# Patient Record
Sex: FEMALE | Race: OTHER | URBAN - METROPOLITAN AREA
[De-identification: names, ages, dates, MRNs, and addresses within clinical notes are randomized per-mention and may not be internally consistent; named-entity substitution may affect disease eponyms.]

---

## 2018-01-08 PROBLEM — E03.9 ACQUIRED HYPOTHYROIDISM: Status: RESOLVED | Noted: 2018-01-08 | Resolved: 2018-01-08

## 2018-01-08 PROBLEM — Z20.2 POSSIBLE EXPOSURE TO STD: Status: ACTIVE | Noted: 2018-01-08

## 2018-01-08 PROBLEM — E06.3 HASHIMOTO'S DISEASE: Status: ACTIVE | Noted: 2018-01-08

## 2018-01-08 PROBLEM — N76.6 GENITAL LABIAL ULCER: Status: ACTIVE | Noted: 2018-01-08

## 2018-01-08 PROBLEM — K62.5 RECTAL BLEEDING: Status: ACTIVE | Noted: 2018-01-08

## 2018-01-08 PROBLEM — R63.4 WEIGHT LOSS: Status: ACTIVE | Noted: 2018-01-08

## 2018-01-08 PROBLEM — E03.9 ACQUIRED HYPOTHYROIDISM: Status: ACTIVE | Noted: 2018-01-08

## 2018-01-08 PROCEDURE — 87591 N.GONORRHOEAE DNA AMP PROB: CPT | Performed by: INTERNAL MEDICINE

## 2018-01-08 PROCEDURE — 86803 HEPATITIS C AB TEST: CPT | Performed by: INTERNAL MEDICINE

## 2018-01-08 PROCEDURE — 86694 HERPES SIMPLEX NES ANTBDY: CPT | Performed by: INTERNAL MEDICINE

## 2018-01-08 PROCEDURE — 86695 HERPES SIMPLEX TYPE 1 TEST: CPT | Performed by: INTERNAL MEDICINE

## 2018-01-08 PROCEDURE — 82746 ASSAY OF FOLIC ACID SERUM: CPT | Performed by: INTERNAL MEDICINE

## 2018-01-08 PROCEDURE — 87340 HEPATITIS B SURFACE AG IA: CPT | Performed by: INTERNAL MEDICINE

## 2018-01-08 PROCEDURE — 82607 VITAMIN B-12: CPT | Performed by: INTERNAL MEDICINE

## 2018-01-08 PROCEDURE — 87491 CHLMYD TRACH DNA AMP PROBE: CPT | Performed by: INTERNAL MEDICINE

## 2018-01-08 PROCEDURE — 86696 HERPES SIMPLEX TYPE 2 TEST: CPT | Performed by: INTERNAL MEDICINE

## 2018-02-13 PROCEDURE — 88305 TISSUE EXAM BY PATHOLOGIST: CPT | Performed by: INTERNAL MEDICINE

## 2019-05-15 ENCOUNTER — APPOINTMENT (OUTPATIENT)
Dept: URBAN - METROPOLITAN AREA CLINIC 292 | Age: 29
Setting detail: DERMATOLOGY
End: 2019-05-15

## 2019-05-15 DIAGNOSIS — L81.0 POSTINFLAMMATORY HYPERPIGMENTATION: ICD-10-CM

## 2019-05-15 DIAGNOSIS — L70.0 ACNE VULGARIS: ICD-10-CM

## 2019-05-15 DIAGNOSIS — L90.5 SCAR CONDITIONS AND FIBROSIS OF SKIN: ICD-10-CM

## 2019-05-15 PROCEDURE — 10040 ACNE SURGERY: CPT

## 2019-05-15 PROCEDURE — OTHER PRESCRIPTION: OTHER

## 2019-05-15 PROCEDURE — OTHER COUNSELING: OTHER

## 2019-05-15 PROCEDURE — 99203 OFFICE O/P NEW LOW 30 MIN: CPT | Mod: 25

## 2019-05-15 PROCEDURE — OTHER CHEMICAL PEEL GLYCOLIC ACID: OTHER

## 2019-05-15 PROCEDURE — OTHER ACNE SURGERY: OTHER

## 2019-05-15 RX ORDER — ERYTHROMYCIN AND BENZOYL PEROXIDE 3 %-5 %
KIT TOPICAL
Qty: 1 | Refills: 6 | Status: ERX | COMMUNITY
Start: 2019-05-15

## 2019-05-15 RX ORDER — SPIRONOLACTONE 50 MG/1
TABLET, FILM COATED ORAL
Qty: 60 | Refills: 3 | Status: ERX | COMMUNITY
Start: 2019-05-15

## 2019-05-15 ASSESSMENT — LOCATION ZONE DERM: LOCATION ZONE: FACE

## 2019-05-15 ASSESSMENT — LOCATION DETAILED DESCRIPTION DERM
LOCATION DETAILED: LEFT SUPERIOR CENTRAL MALAR CHEEK
LOCATION DETAILED: LEFT INFERIOR CENTRAL MALAR CHEEK

## 2019-05-15 ASSESSMENT — LOCATION SIMPLE DESCRIPTION DERM: LOCATION SIMPLE: LEFT CHEEK

## 2019-05-15 NOTE — PROCEDURE: COUNSELING
Benzoyl Peroxide Pregnancy And Lactation Text: This medication is Pregnancy Category C. It is unknown if benzoyl peroxide is excreted in breast milk.
Tetracycline Pregnancy And Lactation Text: This medication is Pregnancy Category D and not consider safe during pregnancy. It is also excreted in breast milk.
Doxycycline Pregnancy And Lactation Text: This medication is Pregnancy Category D and not consider safe during pregnancy. It is also excreted in breast milk but is considered safe for shorter treatment courses.
Minocycline Counseling: Patient advised regarding possible photosensitivity and discoloration of the teeth, skin, lips, tongue and gums.  Patient instructed to avoid sunlight, if possible.  When exposed to sunlight, patients should wear protective clothing, sunglasses, and sunscreen.  The patient was instructed to call the office immediately if the following severe adverse effects occur:  hearing changes, easy bruising/bleeding, severe headache, or vision changes.  The patient verbalized understanding of the proper use and possible adverse effects of minocycline.  All of the patient's questions and concerns were addressed.
Erythromycin Counseling:  I discussed with the patient the risks of erythromycin including but not limited to GI upset, allergic reaction, drug rash, diarrhea, increase in liver enzymes, and yeast infections.
Topical Sulfur Applications Pregnancy And Lactation Text: This medication is Pregnancy Category C and has an unknown safety profile during pregnancy. It is unknown if this topical medication is excreted in breast milk.
Isotretinoin Counseling: Patient should get monthly blood tests, not donate blood, not drive at night if vision affected, not share medication, and not undergo elective surgery for 6 months after tx completed. Side effects reviewed, pt to contact office should one occur.
Azithromycin Pregnancy And Lactation Text: This medication is considered safe during pregnancy and is also secreted in breast milk.
Topical Clindamycin Pregnancy And Lactation Text: This medication is Pregnancy Category B and is considered safe during pregnancy. It is unknown if it is excreted in breast milk.
Tazorac Pregnancy And Lactation Text: This medication is not safe during pregnancy. It is unknown if this medication is excreted in breast milk.
Include Pregnancy/Lactation Warning?: No
Detail Level: Detailed
Doxycycline Counseling:  Patient counseled regarding possible photosensitivity and increased risk for sunburn.  Patient instructed to avoid sunlight, if possible.  When exposed to sunlight, patients should wear protective clothing, sunglasses, and sunscreen.  The patient was instructed to call the office immediately if the following severe adverse effects occur:  hearing changes, easy bruising/bleeding, severe headache, or vision changes.  The patient verbalized understanding of the proper use and possible adverse effects of doxycycline.  All of the patient's questions and concerns were addressed.
Bactrim Counseling:  I discussed with the patient the risks of sulfa antibiotics including but not limited to GI upset, allergic reaction, drug rash, diarrhea, dizziness, photosensitivity, and yeast infections.  Rarely, more serious reactions can occur including but not limited to aplastic anemia, agranulocytosis, methemoglobinemia, blood dyscrasias, liver or kidney failure, lung infiltrates or desquamative/blistering drug rashes.
Isotretinoin Pregnancy And Lactation Text: This medication is Pregnancy Category X and is considered extremely dangerous during pregnancy. It is unknown if it is excreted in breast milk.
High Dose Vitamin A Counseling: Side effects reviewed, pt to contact office should one occur.
Erythromycin Pregnancy And Lactation Text: This medication is Pregnancy Category B and is considered safe during pregnancy. It is also excreted in breast milk.
Spironolactone Counseling: Patient advised regarding risks of diarrhea, abdominal pain, hyperkalemia, birth defects (for female patients), liver toxicity and renal toxicity. The patient may need blood work to monitor liver and kidney function and potassium levels while on therapy. The patient verbalized understanding of the proper use and possible adverse effects of spironolactone.  All of the patient's questions and concerns were addressed.
Topical Clindamycin Counseling: Patient counseled that this medication may cause skin irritation or allergic reactions.  In the event of skin irritation, the patient was advised to reduce the amount of the drug applied or use it less frequently.   The patient verbalized understanding of the proper use and possible adverse effects of clindamycin.  All of the patient's questions and concerns were addressed.
Topical Retinoid Pregnancy And Lactation Text: This medication is Pregnancy Category C. It is unknown if this medication is excreted in breast milk.
Benzoyl Peroxide Counseling: Patient counseled that medicine may cause skin irritation and bleach clothing.  In the event of skin irritation, the patient was advised to reduce the amount of the drug applied or use it less frequently.   The patient verbalized understanding of the proper use and possible adverse effects of benzoyl peroxide.  All of the patient's questions and concerns were addressed.
Tetracycline Counseling: Patient counseled regarding possible photosensitivity and increased risk for sunburn.  Patient instructed to avoid sunlight, if possible.  When exposed to sunlight, patients should wear protective clothing, sunglasses, and sunscreen.  The patient was instructed to call the office immediately if the following severe adverse effects occur:  hearing changes, easy bruising/bleeding, severe headache, or vision changes.  The patient verbalized understanding of the proper use and possible adverse effects of tetracycline.  All of the patient's questions and concerns were addressed. Patient understands to avoid pregnancy while on therapy due to potential birth defects.
Birth Control Pills Counseling: Birth Control Pill Counseling: I discussed with the patient the potential side effects of OCPs including but not limited to increased risk of stroke, heart attack, thrombophlebitis, deep venous thrombosis, hepatic adenomas, breast changes, GI upset, headaches, and depression.  The patient verbalized understanding of the proper use and possible adverse effects of OCPs. All of the patient's questions and concerns were addressed.
Detail Level: Zone
High Dose Vitamin A Pregnancy And Lactation Text: High dose vitamin A therapy is contraindicated during pregnancy and breast feeding.
Bactrim Pregnancy And Lactation Text: This medication is Pregnancy Category D and is known to cause fetal risk.  It is also excreted in breast milk.
Tazorac Counseling:  Patient advised that medication is irritating and drying.  Patient may need to apply sparingly and wash off after an hour before eventually leaving it on overnight.  The patient verbalized understanding of the proper use and possible adverse effects of tazorac.  All of the patient's questions and concerns were addressed.
Topical Sulfur Applications Counseling: Topical Sulfur Counseling: Patient counseled that this medication may cause skin irritation or allergic reactions.  In the event of skin irritation, the patient was advised to reduce the amount of the drug applied or use it less frequently.   The patient verbalized understanding of the proper use and possible adverse effects of topical sulfur application.  All of the patient's questions and concerns were addressed.
Dapsone Pregnancy And Lactation Text: This medication is Pregnancy Category C and is not considered safe during pregnancy or breast feeding.
Azithromycin Counseling:  I discussed with the patient the risks of azithromycin including but not limited to GI upset, allergic reaction, drug rash, diarrhea, and yeast infections.
Birth Control Pills Pregnancy And Lactation Text: This medication should be avoided if pregnant and for the first 30 days post-partum.
Topical Retinoid counseling:  Patient advised to apply a pea-sized amount only at bedtime and wait 30 minutes after washing their face before applying.  If too drying, patient may add a non-comedogenic moisturizer. The patient verbalized understanding of the proper use and possible adverse effects of retinoids.  All of the patient's questions and concerns were addressed.
Dapsone Counseling: I discussed with the patient the risks of dapsone including but not limited to hemolytic anemia, agranulocytosis, rashes, methemoglobinemia, kidney failure, peripheral neuropathy, headaches, GI upset, and liver toxicity.  Patients who start dapsone require monitoring including baseline LFTs and weekly CBCs for the first month, then every month thereafter.  The patient verbalized understanding of the proper use and possible adverse effects of dapsone.  All of the patient's questions and concerns were addressed.
Spironolactone Pregnancy And Lactation Text: This medication can cause feminization of the male fetus and should be avoided during pregnancy. The active metabolite is also found in breast milk.

## 2019-05-15 NOTE — PROCEDURE: ACNE SURGERY
Prep Text (Optional): Prior to removal the treatment areas were prepped in the usual fashion.
Post-Care Instructions: I reviewed with the patient in detail post-care instructions. Patient is to keep the treatment areaas dry overnight, and then apply bacitracin twice daily until healed. Patient may apply hydrogen peroxide soaks to remove any crusting.
Detail Level: Detailed
Acne Type: Comedonal Lesions
Render Number Of Lesions Treated: no
Consent was obtained and risks were reviewed including but not limited to scarring, infection, bleeding, scabbing, incomplete removal, and allergy to anesthesia.

## 2019-05-15 NOTE — PROCEDURE: CHEMICAL PEEL GLYCOLIC ACID
Detail Level: Zone
Glycolic Acid %: 30%
Time (Mins): 3
Prep: The treated area was degreased with acetone and vaseline was applied for protection of mucous membranes.
Treatment Number: 0
Post Peel Care: The peel was neutralized with sodium bicarbonate.  After the procedure, the treatment area was washed with soap and water, and a post-peel cream was applied. Sun protection and post-care instructions were reviewed with the patient.
Price (Use Numbers Only, No Special Characters Or $): 00

## 2022-01-03 ENCOUNTER — OFFICE VISIT (OUTPATIENT)
Dept: DERMATOLOGY | Facility: CLINIC | Age: 32
End: 2022-01-03
Payer: COMMERCIAL

## 2022-01-03 VITALS — BODY MASS INDEX: 29.32 KG/M2 | WEIGHT: 176 LBS | TEMPERATURE: 97.2 F | HEIGHT: 65 IN

## 2022-01-03 DIAGNOSIS — L70.0 ACNE VULGARIS: Primary | ICD-10-CM

## 2022-01-03 PROCEDURE — 99204 OFFICE O/P NEW MOD 45 MIN: CPT | Performed by: DERMATOLOGY

## 2022-01-03 RX ORDER — DOXYCYCLINE HYCLATE 100 MG/1
100 CAPSULE ORAL 2 TIMES DAILY
Qty: 60 CAPSULE | Refills: 3 | Status: SHIPPED | OUTPATIENT
Start: 2022-01-03 | End: 2022-04-03

## 2022-01-03 RX ORDER — LEVOTHYROXINE SODIUM 0.07 MG/1
75 TABLET ORAL DAILY
COMMUNITY

## 2022-01-03 RX ORDER — DEXTROAMPHETAMINE SACCHARATE, AMPHETAMINE ASPARTATE MONOHYDRATE, DEXTROAMPHETAMINE SULFATE AND AMPHETAMINE SULFATE 5; 5; 5; 5 MG/1; MG/1; MG/1; MG/1
20 CAPSULE, EXTENDED RELEASE ORAL EVERY MORNING
COMMUNITY

## 2022-01-03 RX ORDER — BUPROPION HYDROCHLORIDE 100 MG/1
100 TABLET ORAL 2 TIMES DAILY
COMMUNITY

## 2022-01-03 RX ORDER — SPIRONOLACTONE 25 MG/1
25 TABLET ORAL DAILY
Qty: 30 TABLET | Refills: 3 | Status: SHIPPED | OUTPATIENT
Start: 2022-01-03

## 2022-01-03 NOTE — PROGRESS NOTES
[facility] Note     Patient Name: Tushar Van  Encounter Date: 01/03/2022     Have you been cared for by a [facility] in the last 3 years and, if so, which one? No    · Have you traveled outside of the [address] [place] [address] in the past 3 months or outside of the [facility] area in the last 2 weeks? No     May we call your Preferred Phone number to discuss your specific medical information? Yes     May we leave a detailed message that includes your specific medical information? Yes      Today's Chief Concerns:   Concern #1: Acne    Past Medical History:  Have you personally ever had or currently have any of the following? · Skin cancer (such as Melanoma, Basal Cell Carcinoma, Squamous Cell Carcinoma? (If Yes, please provide more detail)- No  · Eczema: No  · Psoriasis: No  · HIV/AIDS: No  · Hepatitis B or C: No  · Tuberculosis: No  · Systemic Immunosuppression such as Diabetes, Biologic or Immunotherapy, Chemotherapy, Organ Transplantation, Bone Marrow Transplantation (If YES, please provide more detail): No  · Radiation Treatment (If YES, please provide more detail): No  · Any other major medical conditions/concerns? (If Yes, which types)- No    Social History:     What is/was your primary occupation? Student      What are your hobbies/past-times? Basketball     Family History:  Have any of your "first degree relatives" (parent, brother, sister, or child) had any of the following       · Skin cancer such as Melanoma or Merkel Cell Carcinoma or Pancreatic Cancer? No  · Eczema, Asthma, Hay Fever or Seasonal Allergies: YES,   · Psoriasis or Psoriatic Arthritis: YES,   · Do any other medical conditions seem to run in your family? If Yes, what condition and which relatives?   No    Current Medications:     Current Outpatient Medications:     amphetamine-dextroamphetamine (ADDERALL XR) 20 MG 24 hr capsule, Take 20 mg by mouth every morning, Disp: , Rfl:   buPROPion (WELLBUTRIN) 100 mg tablet, Take 100 mg by mouth 2 (two) times a day, Disp: , Rfl:     levothyroxine 75 mcg tablet, Take 75 mcg by mouth daily, Disp: , Rfl:       Review of Systems:  Have you recently had or currently have any of the following? If YES, what are you doing for the problem? · Fever, chills or unintended weight loss: No  · Sudden loss or change in your vision: No  · Nausea, vomiting or blood in your stool: No  · Painful or swollen joints: No  · Wheezing or cough: No  · Changing mole or non-healing wound: No  · Nosebleeds: No  · Excessive sweating: No  · Easy or prolonged bleeding? No  · Over the last 2 weeks, how often have you been bothered by the following problems? · Taking little interest or pleasure in doing things: 1 - Not at All  · Feeling down, depressed, or hopeless: 1 - Not at All  · Rapid heartbeat with epinephrine:  No    · FEMALES ONLY:    · Are you pregnant or planning to become pregnant? No  · Are you currently or planning to be nursing or breast feeding? No    · Any known allergies? · No Known Allergies      Physical Exam:     Was a chaperone (Derm Clinical Assistant) present throughout the entire Physical Exam? Yes     Did the Dermatology Team specifically  the patient on the importance of a Full Skin Exam to be sure that nothing is missed clinically?  Yes}  o Did the patient ultimately request or accept a Full Skin Exam?  NO  o Did the patient specifically refuse to have the areas "under-the-bra" examined by the Dermatologist? No  o Did the patient specifically refuse to have the areas "under-the-underwear" examined by the Dermatologist? No    CONSTITUTIONAL:   Vitals:    01/03/22 1446   Temp: (!) 97 2 °F (36 2 °C)   TempSrc: Tympanic   Weight: 79 8 kg (176 lb)   Height: 5' 5" (1 651 m)       PSYCH: Normal mood and affect  EYES: Normal conjunctiva  ENT: Normal lips and oral mucosa  CARDIOVASCULAR: No edema  RESPIRATORY: Normal respirations  HEME/LYMPH/IMMUNO:  No regional lymphadenopathy except as noted below in "ASSESSMENT AND PLAN BY DIAGNOSIS"    SKIN:  FULL ORGAN SYSTEM EXAM   Hair, Scalp, Ears, Face Normal except as noted below in Assessment   Neck, Cervical Chain Nodes Normal except as noted below in Assessment   Right Arm/Hand/Fingers    Left Arm/Hand/Fingers    Chest/Breasts/Axillae    Abdomen, Umbilicus    Back/Spine    Groin/Genitalia/Buttocks    Right Leg, Foot, Toes    Left Leg, Foot, Toes         Assessment and Plan by Diagnosis:    History of Present Condition:     Duration:  How long has this been an issue for you?    o  Years    Location Affected:  Where on the body is this affecting you?    o  Face    Quality:  Is there any bleeding, pain, itch, burning/irritation, or redness associated with the skin lesion?    o  Denies    Severity:  Describe any bleeding, pain, itch, burning/irritation, or redness on a scale of 1 to 10 (with 10 being the worst)  o  10 mentally    Timing:  Does this condition seem to be there pretty constantly or do you notice it more at specific times throughout the day? o  constantly    Context:  Have you ever noticed that this condition seems to be associated with specific activities you do?    o  Denies    Modifying Factors:    o Anything that seems to make the condition worse?    -  Denies   o What have you tried to do to make the condition better? -  Differin     Associated Signs and Symptoms:  Does this skin lesion seem to be associated with any of the following:  o  SL AMB DERM SIGNS AND SYMPTOMS: Redness     ACNE VULGARIS ("COMMON ACNE")    Physical Exam:   Psychiatric/Mood:   Anatomic Location Affected:   Face    Morphological Description: Moderate to severe open and close inflammatory papules   o Open/Closed Comedones:  - Several ("Moderate")  o Inflammatory Papules/Pustules:  - Many ("Severe")  o Nodules:  - Several ("Moderate")  o Scarring:  - Several ("Moderate")  o Excoriations:  - Few ("Mild")  o Local Skin Redness/Erythema:  - Several ("Moderate")  o Local Skin Dryness/Scaling:  - Few ("Mild")  o Local Skin Dyspigmentation:  - Several ("Moderate")   Pertinent Positives:   Pertinent Negatives: n/a     Additional History of Present Condition:  Located on the face  Present for years  Reports menses as a aggravating factor, picking her face  Was treated with Differin cream once daily  Patient reports drying to much, not improved     Assessment and Plan:   We reviewed the causes of acne, the kinds of acne, and the expected clinical course   We discussed treatment options ranging from over-the-counter products, topical retinoids, antibiotics, BP, hormonal therapies (OCPs/spironolactone), and isotretinoin (Accutane)   We reviewed specific over-the-counter interventions and medications  Recommended typical hygiene measures including water-based facial products, washing regularly with mild cleanser, and refraining from picking and popping any pimples   Recommended non-comedogenic sunscreen use daily   Expectations of therapy discussed  Side effects, risks and benefits of medications discussed   A comprehensive handout on Acne was provided   The phone number to call in case of questions or concerns (and instructions to stop medications in such a scenario) was provided     After lengthy discussion of etiology and treatment options, we decided to implement the following personalized treatment plan:    Based on a thorough discussion of this condition and the management approach to it (including a comprehensive discussion of the known risks, side effects and potential benefits of treatment), the patient (family) agrees to implement the following specific plan:    --------------------------------------------------------------------------------------  YOUR PERSONALIZED ACNE ACTION PLAN    [address]    1) SKIN HYGIENE:  In the shower, wash your face, chest and back gently with Cetaphil moisturizing cleanser or Dove Fragrance-free bar  Do not use a luffa or washcloth as these tend to be too irritating to acne-prone skin  2) ANTIBIOTICS:     Doxycycline Take 1 tablet with a full glass of water and food     Start Azelaic Acid 20% cream     EVENING ROUTINE    1) SKIN HYGIENE:  In the shower, wash your face, chest and back gently with Cetaphil moisturizing cleanser or Dove Fragrance-free bar  Do not use a lufa or washcloth as these tend to be too irritating to acne-prone skin  2) ANTIBIOTICS:     Doxycycline Take 1 tablet with a full glass of water and food     Start Azelaic Acid 20% cream     REMEMBER:  Always take your acne pills with lots of water! A pill stuck in your throat can cause significant burning and irritation  Drink a full glass of water to ensure the pill gets into your stomach  Avoid popping a pill right before bed, and stay upright for at least 1 hour after taking a pill  ACNE:  WHAT ZIT ALL ABOUT? WHY DO I HAVE ACNE/PIMPLES? Your skin is made of layers  To keep the skin from becoming dry and cracked, the skin needs oil  The oil is made in little wells in the deeper layers in the skin  People with acne have glands that make more oil and are more easily plugged, causing the glands to swell  Hormones, bacteria and your inherited tendency to have acne all play a role  The medical term for pimples is acne or acne vulgaris (vulgaris means common)  Most people get some acne  Acne does not come from being dirty  Instead, it is an expected consequence of changes that occur during normal growth and development  Hormones, bacteria, and your family's tendency to have acne may all play a role  Whiteheads or blackheads are openings of the glands (glands are the oil factories) onto the surface of the skin   Blackheads are not caused by dirt blocking the pores; instead, they result from the oxidation reaction of oil and skin in the pores with the air (like a rust reaction)  WHAT ABOUT STRESS? Stress does not cause acne but it can make it worse  Make sure you get enough sleep and daily exercise! WHAT ABOUT FOODS/DIET? Try to eat a balanced, healthy diet  Some people feel that certain foods worsen their acne  While there aren't many studies available on this question, severe dietary changes are unlikely to help your acne and may be harmful to the health of your skin  If you find that a certain food seems to aggravate your acne, you may consider avoiding that food  Discuss this with your physician! WHAT CAUSES MY ACNE? There are four contributors to acne--the body's natural oil (sebum), clogged pores, bacteria (with the scientific name Propionibacterium acnes, or P  acnes, for short), and the body's reaction to the bacteria living in the clogged pores (which causes inflammation)  Here's what happens:     Sebum is produced in the normal oil-making glands in the deeper layers of the skin and reaches the surface through the skin's pores  An increase in certain hormones occurs around the time of puberty, and these hormones trigger the oil glands to produce increased amounts of sebum   Pores with excess oil tend to become clogged more easily   At the same time, P  acnes--one of the many types of bacteria that normally live on everyone's skin--thrives in the excess oil and causes a skin reaction (inflammation)   If a pore is clogged close to the surface, there is little inflammation  However, this results in the formation of whiteheads (closed comedones) or blackheads (open comedones) at the surface of the skin   A plug that extends to, or forms a little deeper in the pore, or one that enlarges or ruptures may cause more inflammation  The result is red bumps (papules) and pus-filled pimples (pustules)     If plugging happens in the deepest skin layer, the inflammation may be even more severe, resulting in the formation of nodules or cysts  When these types of acne heal, they may leave behind discolored areas or true scars  SKIN HYGIENE:  HOW SHOULD I [address] MY SKIN? Acne does not come from being dirty, however, washing your face is part of taking good care of your skin and will help keep your face clear  Good skin hygiene is, therefore, critical to support any acne treatment plan  Here are several specific suggestions for practicing good skin hygiene and keeping your skin looking its best:     You should wash acne-prone skin TWICE A DAY: Once in the morning and once in the evening  This does include any showers you take that day, so do not overdo it!  Do not scrub the skin with a washcloth or loofah as these can irritate and inflame your acne  Acne does not come from dirt, so it is not necessary to scrub the skin clean  In fact, scrubbing may lead to dryness and irritation that makes the acne even worse and harder for patients to tolerate acne medications   Use a gentle facial moisturizing cleanser (Cetaphil Moisturizing Cleanser or Dove Fragrance-Free bar)  Avoid using soaps like Hadley Hodgson, Sara [address], [address], or soft/liquid soaps as these products will dry your skin   Do not use any over-the-counter acne washes without your doctor's specific instruction to do so  These products often contain salicylic acid or benzoyl peroxide  These ingredients can be helpful in clearing oil from the skin and reducing bacteria, but they may also be drying and can add to irritation   Do not use exfoliating products with microbeads or brushes as these can cause irritation to the skin   Facials and other treatments to remove, squeeze, or clean out pores are not recommended  Manipulating the skin in this way can make acne worse and can lead to severe infections and/or scarring  It also increases the likelihood that the skin will not be able to tolerate acne medications      Try not to pop pimples or pick at your acne as this can delay healing and may result in scarring or skin color changes (dark spots) that are often more noticeable than the acne itself  Picking/popping acne can also cause a serious skin infection   Wash or change your pillow case once to twice a week, especially if you use products in your hair   Wash the skin as soon as possible after playing sports or other activities that cause a lot of sweating  Also, pay attention to how your sports equipment (shoulder pads, helmet strap, etc ) might be making your acne worse   When you use makeup, moisturizer, or sunscreen make sure that these products are labeled non-comedogenic, or won't clog pores, or won't cause acne         SHOULD I TREAT MY ACNE? There are a number of other skin conditions that can look like acne  If there is any question about the diagnosis, then the person should be evaluated by a board certified pediatric and adolescent dermatologist   A physician should examine any child with acne who is between the ages of 3and 9years of age, as acne in this mid-childhood age group is not normal and may signal an underlying problem  If a preadolescent (9to 6years of age) or adolescent (15to 25years of age) has mild acne and the condition is not bothersome to the individual, proper and regular skin care (what your doctor may call skin hygiene) may be all that is needed at this point  Many people do, however, need specific acne medications to help their skin look and feel its best  Your doctor will tell you if you are one of these people  If so, you may be advised to use an over-the-counter or prescription medication that is applied to the skin (a topical medication) or if the addition of an oral medication (a medication taken by [employer]) is needed  The good news is that the medications work well when used properly!   Some specific factors that may influence the choice of acne therapy include:     Severity  The number and type of skin lesions (papules or comedones) and the degree of inflammation (mild, moderate or severe)   Scarring  Scarring is most common when acne is severe, but it can happen even in children with mild acne   Impact  If a child is experiencing emotional complications because of the acne or is experiencing negative comments from other children   Cost of the acne medications  An acne expert can help to keep out of pocket costs to a minimum by utilizing the correct medications and the least expensive options   The patient's skin type (oily versus dry or combination skin, for example)   Potential side effects of the medication   The ease or overall complexity of the treatment plan or medication  WHAT ACNE TREATMENTS ARE AVAILABLE? Medications for acne try to stop the formation of new pimples by reducing or removing the oil, bacteria, and other things (like dead skin cells) that clog the pores  They can also decrease the inflammation or irritation response of the skin to bacteria  It may take from 6 to 8 weeks (about 2 months!) before you see any improvement and know if the medication is effective  It takes the layers of skin this long to regenerate  Remember, these medications do not cure the condition--the acne improves because of the medication  Therefore, treatment must be continued in order to prevent the return of acne lesions  There are many types of acne treatments  Some are applied to the skin (topical medications) and some are taken by mouth (oral medications)  In most cases of mild acne, the doctor will start with a topical medication  There are many different topical medications that are helpful for acne    If acne is more severe and it does not respond adequately to a topical medication, or if it covers large body surface areas such as the back and/or chest, oral antibiotics such as Doxycycline or Minocycline and/or oral hormone therapy such as Oral Contraceptive Pills or Spironolactone may be prescribed  In the most severe cases, isotretinoin (Accutane) may be used  In general, it is usually best to start with acne medications that are least likely to cause side effects but are at the same time capable of addressing the specific causes for the acne  Some patients have a good result with just one medication, but many will need to use a combination of treatments: two or more different topical agents or an oral medication plus a topical medication  Another treatment used for acne may include corticosteroid injections, which are used to help relieve pain, decrease the size, and encourage the healing of large, inflamed acne nodules  Also, dermatologists sometimes perform acne surgery, using a fine needle, a pointed blade, or an instrument known as a comedone extractor to mechanically clean out clogged pores  One must always weigh the risk for inducing a scar with the potential benefits of any procedure  Prior treatment with topical retinoids can loosen whiteheads and blackheads and make it easier to physically remove such lesions  Heat-based devices, and light and laser therapy are being studied to see whether there is any role for such treatments in mild to moderate acne  At this time, there is not enough evidence to make general recommendations about their use  TOPICAL ACNE MEDICATIONS    WHAT KIND OF TOPICALS ARE THERE?  Benzoyl peroxide (BP) helps to fight inflammation and is anti-microbial (kills bacteria, viruses, and other microorganisms) and is believed to help prevent resistance of bacteria to topical antibiotics  A benzoyl peroxide wash may be recommended for use on large areas such as the chest and/or back  Mild irritation and dryness are common when first using benzoyl peroxide-containing products  Be careful because benzoyl peroxide can bleach towels and clothing!    Retinoids (such as adapalene, tretinoin, or tazarotene) unplug the oil glands by helping peel away the layers of skin and other things plugging the opening of the glands  Mild irritation and dryness are common when first using these products  Facial waxing and other skin procedures can lead to excessive irritation and should be avoided during retinoid therapy   Antibiotics fight bacteria and help decrease inflammation  Topical antibiotics commonly used in acne include clindamycin, erythromycin, and combination agents (such as clindamycin/benzoyl peroxide or erythromycin/benzoyl peroxide)  Mild irritation and dryness are common when first using these products  Typically, topical antibiotics should not be used alone as treatment for acne   Other topical agents include salicylic acid, azelaic acid, dapsone, and sulfacetamide  Mild irritation and dryness can also occur when first using these products  USING YOUR TOPICAL TREATMENTS LIKE A PRO   Apply topical medications only to clean, dry skin  Topical medications may lead to significant dryness of the affected areas  To minimize this, wait 15-20 minutes after washing before applying your topical medication   These medications work deep in the skin to prevent new breakouts  Spot treatment of individual pimples does not do much  When applying topical medications to the face, use the 5-dot method  Start by placing a small pea-sized amount of the medication on your finger  Then, place dots in each of five locations of your face: Mid-forehead, each cheek, nose, and chin  Next, rub the medication into the entire area of skin - not just on individual pimples! Try to avoid the delicate skin around your eyes and corners of your mouth   The medications are not magic! They take weeks if not months to work  Be patient and use your medicine on a daily basis or as directed for six weeks before asking if your skin looks better   Try not to miss more than one or two days each week when using your medications   If you are starting a new medication, then try using it every other night or even every third night   Gradually work up to [place] [employer] [place] a day    This will give your skin time to adjust    The same medications often come in various forms or formulations: Creams, ointments, lotions, gels, microspheres, or foams  Use the formulation that has been recommended and don't switch to other forms unless instructed  Some forms (such as alcohol based gels) may be more drying and less tolerable for certain skin types   Sometimes individual medications are not as effective as a combination of two or more agents  The doctor may need to try several medications or combinations before finding the one that is best for that patient   Moisturizer, sunscreen, and make-up may be used in conjunction with topical acne medications  In general, acne medications are applied first so they may directly contact the skin  Ask your physician to review specific application instructions!  It is especially important to always use sunscreen when using a topical retinoid or oral antibiotic  These drugs can make your skin more sensitive to the sun  In general, sunscreen gets applied AFTER any acne medications   Don't stop using your acne medications just because your acne got better  Remember, the acne is better because of the medication, and prevention is the geller to treatment  ORAL ACNE MEDICATIONS    ORAL ANTIBIOTICS  Antibiotics include tetracycline-class medicines (which include the most commonly used oral antibiotics for acne, minocycline, and doxycycline), erythromycin, trimethoprim-sulfamethoxazole, and occasionally cephalexin or azithromycin  These drugs may decrease bacteria and inflammation, and they are most effective for moderate-to-severe inflammatory acne  A product containing benzoyl peroxide should be used along with these antibiotics to help decrease the possibility of microbial resistance      Always take your acne pills with lots of water! A pill stuck in your throat can cause significant burning and irritation  Drink a full glass of water to ensure the pill gets into your stomach  Avoid popping a pill right before bed, and stay upright for at least 1 hour after taking a pill  DOXYCYCLINE   This medication is usually taken ONCE or TWICE per day, as instructed by your physician  NOTE: Always take this medication with lots of water! A pill stuck in the throat can cause significant burning and irritation  Avoid popping a pill right before bed & stay upright for at least one hour after taking a pill  WARNING: Doxycycline increases your sensitivity to the sun, so practice excellent sun protection! If you notice any of the following, stop using the medication and notify your health care provider: headaches; blurred vision; dizziness; sun sensitivity; heartburn-stomach pain; irritation of the esophagus; darkening of scars, gums, or teeth (more often with minocycline); nail changes; yellowing of the eyes or skin (indicating possible liver disease); joint pains-and flu-like symptoms  Taking oral antibiotics with food may help with symptoms of upset stomach  COMMON SIDE EFFECTS: Headaches; dizziness; sun sensitivity; irritation of the throat; discoloration of scars, gums, or teeth; nail changes  MINOCYCLINE   This medication is usually taken ONCE or TWICE per day, as instructed by your physician  NOTE: Always take this medication with lots of water! A pill stuck in the throat can cause significant burning and irritation  Avoid popping a pill right before bed & stay upright for at least one hour after taking a pill  WARNING: Though less likely than doxycycline, minocycline may increase your sensitivity to the sun, so practice excellent sun protection!  If you notice any of the following, stop using the medication and notify your health care provider: headaches; blurred vision; dizziness; sun sensitivity; heartburn-stomach pain; irritation of the throat; darkening of scars, gums, or teeth; nail changes; yellowing of the eyes or skin (indicating possible liver disease); joint pains-and flu-like symptoms  Taking oral antibiotics with food may help with symptoms of upset stomach  COMMON SIDE EFFECTS: Headaches; dizziness; sun sensitivity; irritation of the throat; discoloration of scars, gums, or teeth (often with minocycline); nail changes  Minocycline can rarely cause liver disease, joint pains, severe skin rashes, and flu-like symptoms  If you should notice yellowing of the eyes or skin, or any of the above, notify your doctor and stop using the medication immediately  HORMONAL THERAPY  Hormonal treatment is used only in females and usually consists of oral contraceptives (birth control pills)  Spironolactone is also sometimes used  ORAL CONTRACEPTIVE PILLS   This medication is also known as the Birth Control Pill    We use it for hormonal regulation of acne  Take this medication as directed on the medication packet  NOTE: Try to find a regular time in your day to take the pill so that you don't forget  The best time is about half an hour after a meal or snack, or at bedtime  If you do forget to take your daily pill at the regular time, take one as soon as you remember and take the next at your regular scheduled time  WARNING: Do not take this medication until discussing it with your physician if you smoke, are pregnant (or trying to become pregnant or could be pregnant), have a personal history of breast cancer, have any artificial hardware or implants, have a condition called Factor 5 Leiden deficiency, have a family history of clotting problems, regularly have migraine headaches (especially with aura or due to flashing lights), or have any vaginal bleeding other than that associated with your menstrual cycle       ORAL ISOTRETINOIN (used to be called the brand name ACCUTANE)  Isotretinoin, a derivative of vitamin A, is a powerful drug with several significant potential side effects  It is reserved for acne which is severe or when other medications have not worked well enough  It used to be sold under the brand name Accutane but now several versions exist       HAVING PROBLEMS WITH ANY OF YOUR TREATMENTS? You should not be able to see any of the medicines on your face  If you can see a white film on your skin after you apply the medication, there is too much medicine in that area and you need to apply a thinner coat and make sure it is spread evenly on your face  If your skin gets too dry, you can apply a light (non-comedogenic) moisturizer on top of your medicine or you may switch to using the medicine every other day instead of every day  If your skin is still too irritated, you may need to switch to a milder medication  If your skin is red and very itchy, you may be allergic to the medication and you should stop using it  COMMON POSSIBLE SIDE EFFECTS OF MEDICATIONS     Retinoids - dryness, redness, increased sun sensitivity   Benzoyl peroxide - drying, redness, bleaching of clothes, towels and sheets, allergy   Doxycycline - headaches; dizziness; irritation of the throat; nail changes; discoloration of teeth   Sun sensitivity - even if you have dark skin, this medicine can make you burn more easily  Make sure you protect yourself from the sun, either by avoiding being outside between 11 AM and 3 PM, wearing and reapplying sunscreen/sunblock, or wearing sun protective clothing   Nausea/vomiting - if you experience nausea with this medication, take it with food   Minocycline - headaches; dizziness; vision problems,  irritation of the throat; discoloration of scars, gums, or teeth  Can rarely cause liver disease, joint pains, and flu-like symptoms       If you should notice yellowing of the skin or any of the above, notify your doctor and stop using the medication   Birth Control Pills - nausea; headaches; breast tenderness; feeling bloated; mood changes   Spotting between periods may occur for the first three weeks of the medication, but this is not serious  It may last for two or three cycles  Please call us if the bleeding is heavier than a light flow or lasts for more than a few days  WHEN AND WHERE TO CALL WITH CONCERNS  We are here to help! If you experience any unusual symptoms, then stop taking or using the medication and call our office at [phone])  It is better to be safe than to be sorry!       Scribe Attestation    I,:  Gilbert Busch MA am acting as a scribe while in the presence of the attending physician :       I,:  Abdi Su MD personally performed the services described in this documentation    as scribed in my presence :

## 2022-01-03 NOTE — PATIENT INSTRUCTIONS
YOUR PERSONALIZED ACNE ACTION PLAN    [address] [facility]    1) SKIN HYGIENE:  In the shower, wash your face, chest and back gently with Cetaphil moisturizing cleanser or Dove Fragrance-free bar  Do not use a luffa or washcloth as these tend to be too irritating to acne-prone skin  2) ANTIBIOTICS:     Doxycycline Take 1 tablet with a full glass of water and food     Start Azelaic Acid 20% cream     EVENING ROUTINE    1) SKIN HYGIENE:  In the shower, wash your face, chest and back gently with Cetaphil moisturizing cleanser or Dove Fragrance-free bar  Do not use a lufa or washcloth as these tend to be too irritating to acne-prone skin  2) ANTIBIOTICS:     Doxycycline Take 1 tablet with a full glass of water and food     Start Azelaic Acid 20% cream     REMEMBER:  Always take your acne pills with lots of water! A pill stuck in your throat can cause significant burning and irritation  Drink a full glass of water to ensure the pill gets into your stomach  Avoid popping a pill right before bed, and stay upright for at least 1 hour after taking a pill  ACNE:  WHAT ZIT ALL ABOUT? WHY DO I HAVE ACNE/PIMPLES? Your skin is made of layers  To keep the skin from becoming dry and cracked, the skin needs oil  The oil is made in little wells in the deeper layers in the skin  People with acne have glands that make more oil and are more easily plugged, causing the glands to swell  Hormones, bacteria and your inherited tendency to have acne all play a role  The medical term for pimples is acne or acne vulgaris (vulgaris means common)  Most people get some acne  Acne does not come from being dirty  Instead, it is an expected consequence of changes that occur during normal growth and development  Hormones, bacteria, and your family's tendency to have acne may all play a role  Whiteheads or blackheads are openings of the glands (glands are the oil factories) onto the surface of the skin  Blackheads are not caused by dirt blocking the pores; instead, they result from the oxidation reaction of oil and skin in the pores with the air (like a rust reaction)  WHAT ABOUT STRESS? Stress does not cause acne but it can make it worse  Make sure you get enough sleep and daily exercise! WHAT ABOUT FOODS/DIET? Try to eat a balanced, healthy diet  Some people feel that certain foods worsen their acne  While there aren't many studies available on this question, severe dietary changes are unlikely to help your acne and may be harmful to the health of your skin  If you find that a certain food seems to aggravate your acne, you may consider avoiding that food  Discuss this with your physician! WHAT CAUSES MY ACNE? There are four contributors to acne--the body's natural oil (sebum), clogged pores, bacteria (with the scientific name Propionibacterium acnes, or P  acnes, for short), and the body's reaction to the bacteria living in the clogged pores (which causes inflammation)  Here's what happens:     Sebum is produced in the normal oil-making glands in the deeper layers of the skin and reaches the surface through the skin's pores  An increase in certain hormones occurs around the time of puberty, and these hormones trigger the oil glands to produce increased amounts of sebum   Pores with excess oil tend to become clogged more easily   At the same time, P  acnes--one of the many types of bacteria that normally live on everyone's skin--thrives in the excess oil and causes a skin reaction (inflammation)   If a pore is clogged close to the surface, there is little inflammation  However, this results in the formation of whiteheads (closed comedones) or blackheads (open comedones) at the surface of the skin   A plug that extends to, or forms a little deeper in the pore, or one that enlarges or ruptures may cause more inflammation   The result is red bumps (papules) and pus-filled pimples (pustules)   If plugging happens in the deepest skin layer, the inflammation may be even more severe, resulting in the formation of nodules or cysts  When these types of acne heal, they may leave behind discolored areas or true scars  SKIN HYGIENE:  HOW SHOULD I [address] MY SKIN? Acne does not come from being dirty, however, washing your face is part of taking good care of your skin and will help keep your face clear  Good skin hygiene is, therefore, critical to support any acne treatment plan  Here are several specific suggestions for practicing good skin hygiene and keeping your skin looking its best:     You should wash acne-prone skin TWICE A DAY: Once in the morning and once in the evening  This does include any showers you take that day, so do not overdo it!  Do not scrub the skin with a washcloth or loofah as these can irritate and inflame your acne  Acne does not come from dirt, so it is not necessary to scrub the skin clean  In fact, scrubbing may lead to dryness and irritation that makes the acne even worse and harder for patients to tolerate acne medications   Use a gentle facial moisturizing cleanser (Cetaphil Moisturizing Cleanser or Dove Fragrance-Free bar)  Avoid using soaps like Hadley Hodgson, Sara Lynch 39, [address] [facility], or soft/liquid soaps as these products will dry your skin   Do not use any over-the-counter acne washes without your doctor's specific instruction to do so  These products often contain salicylic acid or benzoyl peroxide  These ingredients can be helpful in clearing oil from the skin and reducing bacteria, but they may also be drying and can add to irritation   Do not use exfoliating products with microbeads or brushes as these can cause irritation to the skin   Facials and other treatments to remove, squeeze, or clean out pores are not recommended  Manipulating the skin in this way can make acne worse and can lead to severe infections and/or scarring   It also increases the likelihood that the skin will not be able to tolerate acne medications   Try not to pop pimples or pick at your acne as this can delay healing and may result in scarring or skin color changes (dark spots) that are often more noticeable than the acne itself  Picking/popping acne can also cause a serious skin infection   Wash or change your pillow case once to twice a week, especially if you use products in your hair   Wash the skin as soon as possible after playing sports or other activities that cause a lot of sweating  Also, pay attention to how your sports equipment (shoulder pads, helmet strap, etc ) might be making your acne worse   When you use makeup, moisturizer, or sunscreen make sure that these products are labeled non-comedogenic, or won't clog pores, or won't cause acne         SHOULD I TREAT MY ACNE? There are a number of other skin conditions that can look like acne  If there is any question about the diagnosis, then the person should be evaluated by a board certified pediatric and adolescent dermatologist   A physician should examine any child with acne who is between the ages of 3and 9years of age, as acne in this mid-childhood age group is not normal and may signal an underlying problem  If a preadolescent (9to 6years of age) or adolescent (15to 25years of age) has mild acne and the condition is not bothersome to the individual, proper and regular skin care (what your doctor may call skin hygiene) may be all that is needed at this point  Many people do, however, need specific acne medications to help their skin look and feel its best  Your doctor will tell you if you are one of these people  If so, you may be advised to use an over-the-counter or prescription medication that is applied to the skin (a topical medication) or if the addition of an oral medication (a medication taken by [employer]) is needed   The good news is that the medications work well when used properly! Some specific factors that may influence the choice of acne therapy include:     Severity  The number and type of skin lesions (papules or comedones) and the degree of inflammation (mild, moderate or severe)   Scarring  Scarring is most common when acne is severe, but it can happen even in children with mild acne   Impact  If a child is experiencing emotional complications because of the acne or is experiencing negative comments from other children   Cost of the acne medications  An acne expert can help to keep out of pocket costs to a minimum by utilizing the correct medications and the least expensive options   The patient's skin type (oily versus dry or combination skin, for example)   Potential side effects of the medication   The ease or overall complexity of the treatment plan or medication  WHAT ACNE TREATMENTS ARE AVAILABLE? Medications for acne try to stop the formation of new pimples by reducing or removing the oil, bacteria, and other things (like dead skin cells) that clog the pores  They can also decrease the inflammation or irritation response of the skin to bacteria  It may take from 6 to 8 weeks (about 2 months!) before you see any improvement and know if the medication is effective  It takes the layers of skin this long to regenerate  Remember, these medications do not cure the condition--the acne improves because of the medication  Therefore, treatment must be continued in order to prevent the return of acne lesions  There are many types of acne treatments  Some are applied to the skin (topical medications) and some are taken by mouth (oral medications)  In most cases of mild acne, the doctor will start with a topical medication  There are many different topical medications that are helpful for acne    If acne is more severe and it does not respond adequately to a topical medication, or if it covers large body surface areas such as the back and/or chest, oral antibiotics such as Doxycycline or Minocycline and/or oral hormone therapy such as Oral Contraceptive Pills or Spironolactone may be prescribed  In the most severe cases, isotretinoin (Accutane) may be used  In general, it is usually best to start with acne medications that are least likely to cause side effects but are at the same time capable of addressing the specific causes for the acne  Some patients have a good result with just one medication, but many will need to use a combination of treatments: two or more different topical agents or an oral medication plus a topical medication  Another treatment used for acne may include corticosteroid injections, which are used to help relieve pain, decrease the size, and encourage the healing of large, inflamed acne nodules  Also, dermatologists sometimes perform acne surgery, using a fine needle, a pointed blade, or an instrument known as a comedone extractor to mechanically clean out clogged pores  One must always weigh the risk for inducing a scar with the potential benefits of any procedure  Prior treatment with topical retinoids can loosen whiteheads and blackheads and make it easier to physically remove such lesions  Heat-based devices, and light and laser therapy are being studied to see whether there is any role for such treatments in mild to moderate acne  At this time, there is not enough evidence to make general recommendations about their use  TOPICAL ACNE MEDICATIONS    WHAT KIND OF TOPICALS ARE THERE?  Benzoyl peroxide (BP) helps to fight inflammation and is anti-microbial (kills bacteria, viruses, and other microorganisms) and is believed to help prevent resistance of bacteria to topical antibiotics  A benzoyl peroxide wash may be recommended for use on large areas such as the chest and/or back  Mild irritation and dryness are common when first using benzoyl peroxide-containing products   Be careful because benzoyl peroxide can bleach towels and clothing!  Retinoids (such as adapalene, tretinoin, or tazarotene) unplug the oil glands by helping peel away the layers of skin and other things plugging the opening of the glands  Mild irritation and dryness are common when first using these products  Facial waxing and other skin procedures can lead to excessive irritation and should be avoided during retinoid therapy   Antibiotics fight bacteria and help decrease inflammation  Topical antibiotics commonly used in acne include clindamycin, erythromycin, and combination agents (such as clindamycin/benzoyl peroxide or erythromycin/benzoyl peroxide)  Mild irritation and dryness are common when first using these products  Typically, topical antibiotics should not be used alone as treatment for acne   Other topical agents include salicylic acid, azelaic acid, dapsone, and sulfacetamide  Mild irritation and dryness can also occur when first using these products  USING YOUR TOPICAL TREATMENTS LIKE A PRO   Apply topical medications only to clean, dry skin  Topical medications may lead to significant dryness of the affected areas  To minimize this, wait 15-20 minutes after washing before applying your topical medication   These medications work deep in the skin to prevent new breakouts  Spot treatment of individual pimples does not do much  When applying topical medications to the face, use the 5-dot method  Start by placing a small pea-sized amount of the medication on your finger  Then, place dots in each of five locations of your face: Mid-forehead, each cheek, nose, and chin  Next, rub the medication into the entire area of skin - not just on individual pimples! Try to avoid the delicate skin around your eyes and corners of your mouth   The medications are not magic! They take weeks if not months to work   Be patient and use your medicine on a daily basis or as directed for six weeks before asking if your skin looks better  Try not to miss more than one or two days each week when using your medications   If you are starting a new medication, then try using it every other night or even every third night   Gradually work up to [place] [employer] [place] a day    This will give your skin time to adjust    The same medications often come in various forms or formulations: Creams, ointments, lotions, gels, microspheres, or foams  Use the formulation that has been recommended and don't switch to other forms unless instructed  Some forms (such as alcohol based gels) may be more drying and less tolerable for certain skin types   Sometimes individual medications are not as effective as a combination of two or more agents  The doctor may need to try several medications or combinations before finding the one that is best for that patient   Moisturizer, sunscreen, and make-up may be used in conjunction with topical acne medications  In general, acne medications are applied first so they may directly contact the skin  Ask your physician to review specific application instructions!  It is especially important to always use sunscreen when using a topical retinoid or oral antibiotic  These drugs can make your skin more sensitive to the sun  In general, sunscreen gets applied AFTER any acne medications   Don't stop using your acne medications just because your acne got better  Remember, the acne is better because of the medication, and prevention is the geller to treatment  ORAL ACNE MEDICATIONS    ORAL ANTIBIOTICS  Antibiotics include tetracycline-class medicines (which include the most commonly used oral antibiotics for acne, minocycline, and doxycycline), erythromycin, trimethoprim-sulfamethoxazole, and occasionally cephalexin or azithromycin  These drugs may decrease bacteria and inflammation, and they are most effective for moderate-to-severe inflammatory acne   A product containing benzoyl peroxide should be used along with these antibiotics to help decrease the possibility of microbial resistance  Always take your acne pills with lots of water! A pill stuck in your throat can cause significant burning and irritation  Drink a full glass of water to ensure the pill gets into your stomach  Avoid popping a pill right before bed, and stay upright for at least 1 hour after taking a pill  DOXYCYCLINE   This medication is usually taken ONCE or TWICE per day, as instructed by your physician  NOTE: Always take this medication with lots of water! A pill stuck in the throat can cause significant burning and irritation  Avoid popping a pill right before bed & stay upright for at least one hour after taking a pill  WARNING: Doxycycline increases your sensitivity to the sun, so practice excellent sun protection! If you notice any of the following, stop using the medication and notify your health care provider: headaches; blurred vision; dizziness; sun sensitivity; heartburn-stomach pain; irritation of the esophagus; darkening of scars, gums, or teeth (more often with minocycline); nail changes; yellowing of the eyes or skin (indicating possible liver disease); joint pains-and flu-like symptoms  Taking oral antibiotics with food may help with symptoms of upset stomach  COMMON SIDE EFFECTS: Headaches; dizziness; sun sensitivity; irritation of the throat; discoloration of scars, gums, or teeth; nail changes  MINOCYCLINE   This medication is usually taken ONCE or TWICE per day, as instructed by your physician  NOTE: Always take this medication with lots of water! A pill stuck in the throat can cause significant burning and irritation  Avoid popping a pill right before bed & stay upright for at least one hour after taking a pill  WARNING: Though less likely than doxycycline, minocycline may increase your sensitivity to the sun, so practice excellent sun protection!  If you notice any of the following, stop using the medication and notify your health care provider: headaches; blurred vision; dizziness; sun sensitivity; heartburn-stomach pain; irritation of the throat; darkening of scars, gums, or teeth; nail changes; yellowing of the eyes or skin (indicating possible liver disease); joint pains-and flu-like symptoms  Taking oral antibiotics with food may help with symptoms of upset stomach  COMMON SIDE EFFECTS: Headaches; dizziness; sun sensitivity; irritation of the throat; discoloration of scars, gums, or teeth (often with minocycline); nail changes  Minocycline can rarely cause liver disease, joint pains, severe skin rashes, and flu-like symptoms  If you should notice yellowing of the eyes or skin, or any of the above, notify your doctor and stop using the medication immediately  HORMONAL THERAPY  Hormonal treatment is used only in females and usually consists of oral contraceptives (birth control pills)  Spironolactone is also sometimes used  ORAL CONTRACEPTIVE PILLS   This medication is also known as the Birth Control Pill    We use it for hormonal regulation of acne  Take this medication as directed on the medication packet  NOTE: Try to find a regular time in your day to take the pill so that you don't forget  The best time is about half an hour after a meal or snack, or at bedtime  If you do forget to take your daily pill at the regular time, take one as soon as you remember and take the next at your regular scheduled time  WARNING: Do not take this medication until discussing it with your physician if you smoke, are pregnant (or trying to become pregnant or could be pregnant), have a personal history of breast cancer, have any artificial hardware or implants, have a condition called Factor 5 Leiden deficiency, have a family history of clotting problems, regularly have migraine headaches (especially with aura or due to flashing lights), or have any vaginal bleeding other than that associated with your menstrual cycle  ORAL ISOTRETINOIN (used to be called the brand name Ilya Magana)  Isotretinoin, a derivative of vitamin A, is a powerful drug with several significant potential side effects  It is reserved for acne which is severe or when other medications have not worked well enough  It used to be sold under the brand name Accutane but now several versions exist       HAVING PROBLEMS WITH ANY OF YOUR TREATMENTS? You should not be able to see any of the medicines on your face  If you can see a white film on your skin after you apply the medication, there is too much medicine in that area and you need to apply a thinner coat and make sure it is spread evenly on your face  If your skin gets too dry, you can apply a light (non-comedogenic) moisturizer on top of your medicine or you may switch to using the medicine every other day instead of every day  If your skin is still too irritated, you may need to switch to a milder medication  If your skin is red and very itchy, you may be allergic to the medication and you should stop using it  COMMON POSSIBLE SIDE EFFECTS OF MEDICATIONS     Retinoids - dryness, redness, increased sun sensitivity   Benzoyl peroxide - drying, redness, bleaching of clothes, towels and sheets, allergy   Doxycycline - headaches; dizziness; irritation of the throat; nail changes; discoloration of teeth   Sun sensitivity - even if you have dark skin, this medicine can make you burn more easily  Make sure you protect yourself from the sun, either by avoiding being outside between 11 AM and 3 PM, wearing and reapplying sunscreen/sunblock, or wearing sun protective clothing   Nausea/vomiting - if you experience nausea with this medication, take it with food   Minocycline - headaches; dizziness; vision problems,  irritation of the throat; discoloration of scars, gums, or teeth  Can rarely cause liver disease, joint pains, and flu-like symptoms       If you should notice yellowing of the skin or any of the above, notify your doctor and stop using the medication   Birth Control Pills - nausea; headaches; breast tenderness; feeling bloated; mood changes   Spotting between periods may occur for the first three weeks of the medication, but this is not serious  It may last for two or three cycles  Please call us if the bleeding is heavier than a light flow or lasts for more than a few days  WHEN AND WHERE TO CALL WITH CONCERNS  We are here to help! If you experience any unusual symptoms, then stop taking or using the medication and call our office at [phone])  It is better to be safe than to be sorry!

## 2022-01-18 ENCOUNTER — TELEPHONE (OUTPATIENT)
Dept: DERMATOLOGY | Facility: CLINIC | Age: 32
End: 2022-01-18

## 2022-02-03 DIAGNOSIS — L70.0 ACNE VULGARIS: Primary | ICD-10-CM

## 2022-02-03 RX ORDER — AZELAIC ACID 0.15 G/G
1 GEL TOPICAL 2 TIMES DAILY
Qty: 30 G | Refills: 2 | Status: SHIPPED | OUTPATIENT
Start: 2022-02-03

## 2022-02-03 RX ORDER — INDAPAMIDE 1.25 MG
1 TABLET ORAL 2 TIMES DAILY
Qty: 30 G | Refills: 3 | Status: SHIPPED | OUTPATIENT
Start: 2022-02-03 | End: 2022-02-03

## 2023-03-22 ENCOUNTER — OFFICE VISIT (OUTPATIENT)
Dept: FAMILY MEDICINE CLINIC | Facility: CLINIC | Age: 33
End: 2023-03-22

## 2023-03-22 VITALS
TEMPERATURE: 97.8 F | BODY MASS INDEX: 29.29 KG/M2 | HEART RATE: 76 BPM | HEIGHT: 65 IN | WEIGHT: 175.8 LBS | SYSTOLIC BLOOD PRESSURE: 114 MMHG | OXYGEN SATURATION: 98 % | DIASTOLIC BLOOD PRESSURE: 72 MMHG

## 2023-03-22 DIAGNOSIS — Z30.09 GENERAL COUNSELING AND ADVICE ON CONTRACEPTIVE MANAGEMENT: ICD-10-CM

## 2023-03-22 DIAGNOSIS — Z83.3 FAMILY HISTORY OF DIABETES MELLITUS: Primary | ICD-10-CM

## 2023-03-22 DIAGNOSIS — Z11.3 SCREEN FOR STD (SEXUALLY TRANSMITTED DISEASE): ICD-10-CM

## 2023-03-22 DIAGNOSIS — R55 SYNCOPE AND COLLAPSE: ICD-10-CM

## 2023-03-22 DIAGNOSIS — E03.9 ACQUIRED HYPOTHYROIDISM: ICD-10-CM

## 2023-03-22 DIAGNOSIS — L70.0 ACNE VULGARIS: ICD-10-CM

## 2023-03-22 DIAGNOSIS — F33.0 MILD EPISODE OF RECURRENT MAJOR DEPRESSIVE DISORDER (HCC): ICD-10-CM

## 2023-03-22 DIAGNOSIS — Z83.438 FAMILY HISTORY OF HYPERLIPIDEMIA: ICD-10-CM

## 2023-03-22 DIAGNOSIS — G47.429 NARCOLEPSY DUE TO UNDERLYING CONDITION WITHOUT CATAPLEXY: ICD-10-CM

## 2023-03-22 DIAGNOSIS — E55.9 VITAMIN D DEFICIENCY: ICD-10-CM

## 2023-03-22 DIAGNOSIS — Z01.419 ENCOUNTER FOR GYNECOLOGICAL EXAMINATION WITHOUT ABNORMAL FINDING: ICD-10-CM

## 2023-03-22 RX ORDER — ETONOGESTREL AND ETHINYL ESTRADIOL 11.7; 2.7 MG/1; MG/1
INSERT, EXTENDED RELEASE VAGINAL
Qty: 3 EACH | Refills: 1 | Status: SHIPPED | OUTPATIENT
Start: 2023-03-22

## 2023-03-22 RX ORDER — SPIRONOLACTONE 25 MG/1
25 TABLET ORAL DAILY
Qty: 90 TABLET | Refills: 1 | Status: SHIPPED | OUTPATIENT
Start: 2023-03-22

## 2023-03-22 RX ORDER — BUPROPION HYDROCHLORIDE 300 MG/1
300 TABLET ORAL DAILY
COMMUNITY
Start: 2022-12-20 | End: 2023-03-22 | Stop reason: SDUPTHER

## 2023-03-22 RX ORDER — LEVOTHYROXINE SODIUM 0.07 MG/1
75 TABLET ORAL DAILY
Qty: 90 TABLET | Refills: 1 | Status: SHIPPED | OUTPATIENT
Start: 2023-03-22

## 2023-03-22 RX ORDER — BUPROPION HYDROCHLORIDE 300 MG/1
300 TABLET ORAL DAILY
Qty: 90 TABLET | Refills: 3 | Status: SHIPPED | OUTPATIENT
Start: 2023-03-22

## 2023-03-22 RX ORDER — BLOOD SUGAR DIAGNOSTIC
STRIP MISCELLANEOUS
Qty: 100 EACH | Refills: 3 | Status: SHIPPED | OUTPATIENT
Start: 2023-03-22

## 2023-03-22 RX ORDER — DEXTROAMPHETAMINE SACCHARATE, AMPHETAMINE ASPARTATE, DEXTROAMPHETAMINE SULFATE AND AMPHETAMINE SULFATE 2.5; 2.5; 2.5; 2.5 MG/1; MG/1; MG/1; MG/1
TABLET ORAL
COMMUNITY
Start: 2019-03-01

## 2023-03-22 RX ORDER — BUPROPION HYDROCHLORIDE 150 MG/1
TABLET ORAL
COMMUNITY
Start: 2016-10-01 | End: 2023-03-22 | Stop reason: SDUPTHER

## 2023-03-22 RX ORDER — LANCETS 33 GAUGE
EACH MISCELLANEOUS
Qty: 100 EACH | Refills: 3 | Status: SHIPPED | OUTPATIENT
Start: 2023-03-22

## 2023-03-22 RX ORDER — BUPROPION HYDROCHLORIDE 150 MG/1
150 TABLET ORAL EVERY MORNING
Qty: 90 TABLET | Refills: 3 | Status: SHIPPED | OUTPATIENT
Start: 2023-03-22

## 2023-03-22 RX ORDER — BLOOD-GLUCOSE METER
KIT MISCELLANEOUS
Qty: 1 KIT | Refills: 0 | Status: SHIPPED | OUTPATIENT
Start: 2023-03-22

## 2023-03-22 NOTE — PROGRESS NOTES
Assessment            Diagnosis ICD-10-CM Associated Orders   1  Family history of diabetes mellitus  Z83 3 Hemoglobin A1C     Blood Glucose Monitoring Suppl (OneTouch Verio Reflect) w/Device KIT     glucose blood (OneTouch Verio) test strip     OneTouch Delica Lancets 87J MISC      2  Family history of hyperlipidemia  Z83 438 Lipid panel      3  Encounter for gynecological examination without abnormal finding  Z01 419 Liquid-based pap, screening      4  Vitamin D deficiency  E55 9 Vitamin D 25 hydroxy      5  Acne vulgaris  L70 0 spironolactone (ALDACTONE) 25 mg tablet      6  Acquired hypothyroidism  E03 9 CBC and differential     Comprehensive metabolic panel     Lipid panel     TSH, 3rd generation     levothyroxine 75 mcg tablet      7  Mild episode of recurrent major depressive disorder (HCC)  F33 0 buPROPion (WELLBUTRIN XL) 150 mg 24 hr tablet     buPROPion (WELLBUTRIN XL) 300 mg 24 hr tablet      8  Screen for STD (sexually transmitted disease)  Z11 3 Hepatitis B surface antibody     Hepatitis C antibody     HIV-1 RNA, quantitative, PCR     Chlamydia/GC amplified DNA by PCR     Chlamydia/GC amplified DNA by PCR      9  Syncope and collapse  R55 CBC and differential     Hemoglobin A1C     TSH, 3rd generation     Blood Glucose Monitoring Suppl (OneTouch Verio Reflect) w/Device KIT     glucose blood (OneTouch Verio) test strip     OneTouch Delica Lancets 51P MISC      10  General counseling and advice on contraceptive management  Z30 09 etonogestrel-ethinyl estradiol (NuvaRing) 0 12-0 015 MG/24HR vaginal ring           Plan      Await pap smear results  Diagnosis ICD-10-CM Associated Orders   1  Family history of diabetes mellitus  Z83 3 Hemoglobin A1C     Blood Glucose Monitoring Suppl (OneTouch Verio Reflect) w/Device KIT     glucose blood (OneTouch Verio) test strip     OneTouch Delica Lancets 43K MISC      2  Family history of hyperlipidemia  Z83 438 Lipid panel      3   Encounter for gynecological examination without abnormal finding  Z01 419 Liquid-based pap, screening      4  Vitamin D deficiency  E55 9 Vitamin D 25 hydroxy      5  Acne vulgaris  L70 0 spironolactone (ALDACTONE) 25 mg tablet      6  Acquired hypothyroidism  E03 9 CBC and differential     Comprehensive metabolic panel     Lipid panel     TSH, 3rd generation     levothyroxine 75 mcg tablet      7  Mild episode of recurrent major depressive disorder (HCC)  F33 0 buPROPion (WELLBUTRIN XL) 150 mg 24 hr tablet     buPROPion (WELLBUTRIN XL) 300 mg 24 hr tablet      8  Screen for STD (sexually transmitted disease)  Z11 3 Hepatitis B surface antibody     Hepatitis C antibody     HIV-1 RNA, quantitative, PCR     Chlamydia/GC amplified DNA by PCR     Chlamydia/GC amplified DNA by PCR      9  Syncope and collapse  R55 CBC and differential     Hemoglobin A1C     TSH, 3rd generation     Blood Glucose Monitoring Suppl (OneTouch Verio Reflect) w/Device KIT     glucose blood (OneTouch Verio) test strip     OneTouch Delica Lancets 58L MISC      10  General counseling and advice on contraceptive management  Z30 09 etonogestrel-ethinyl estradiol (NuvaRing) 0 12-0 015 MG/24HR vaginal ring          Anthony Calderon is a 27 y o  female who presents for annual exam  Periods are regular every 28-30 days, lasting 5 days  Dysmenorrhea:mild, occurring first 1-2 days of flow  Cyclic symptoms include back pain  No intermenstrual bleeding, spotting, or discharge  The patient reports that there is not domestic violence in her life  Current contraception: none  History of abnormal Pap smear: no  Family history of uterine or ovarian cancer: no  Regular self breast exam: no  History of abnormal mammogram: no  Family history of breast cancer: no  History of abnormal lipids: no  Menstrual History:  OB History    No obstetric history on file  Menarche age: 4  Patient's last menstrual period was 03/01/2023         The following portions of the patient's history were reviewed and updated as appropriate: allergies, current medications, past family history, past medical history, past social history, past surgical history and problem list     Review of Systems  Respiratory: negative  Cardiovascular: negative  Gastrointestinal: negative  Genitourinary:positive for urinary incontinence  Neurological: positive for paresthesia      Objective      /72 (BP Location: Right arm, Patient Position: Sitting, Cuff Size: Standard)   Pulse 76   Temp 97 8 °F (36 6 °C)   Ht 5' 5" (1 651 m)   Wt 79 7 kg (175 lb 12 8 oz)   LMP 03/01/2023   SpO2 98%   BMI 29 25 kg/m²     General:   alert and oriented, in no acute distress, appears stated age and cooperative   Heart: regular rate and rhythm, S1, S2 normal, no murmur, click, rub or gallop   Lungs: clear to auscultation bilaterally   Abdomen: soft, non-tender, without masses or organomegaly   Vulva: normal   Vagina: normal mucosa   Cervix: no bleeding following Pap and no cervical motion tenderness   Uterus: normal size, non-tender   Adnexa: normal adnexa and no mass, fullness, tenderness

## 2023-03-23 ENCOUNTER — APPOINTMENT (OUTPATIENT)
Dept: LAB | Facility: CLINIC | Age: 33
End: 2023-03-23

## 2023-03-23 DIAGNOSIS — Z11.3 SCREEN FOR STD (SEXUALLY TRANSMITTED DISEASE): ICD-10-CM

## 2023-03-23 DIAGNOSIS — E03.9 ACQUIRED HYPOTHYROIDISM: ICD-10-CM

## 2023-03-23 DIAGNOSIS — E55.9 VITAMIN D DEFICIENCY: ICD-10-CM

## 2023-03-23 DIAGNOSIS — Z83.3 FAMILY HISTORY OF DIABETES MELLITUS: ICD-10-CM

## 2023-03-23 DIAGNOSIS — R55 SYNCOPE AND COLLAPSE: ICD-10-CM

## 2023-03-23 DIAGNOSIS — Z83.438 FAMILY HISTORY OF HYPERLIPIDEMIA: ICD-10-CM

## 2023-03-23 LAB
25(OH)D3 SERPL-MCNC: 18.5 NG/ML (ref 30–100)
ALBUMIN SERPL BCP-MCNC: 4 G/DL (ref 3.5–5)
ALP SERPL-CCNC: 62 U/L (ref 34–104)
ALT SERPL W P-5'-P-CCNC: 19 U/L (ref 7–52)
ANION GAP SERPL CALCULATED.3IONS-SCNC: 8 MMOL/L (ref 4–13)
AST SERPL W P-5'-P-CCNC: 17 U/L (ref 13–39)
BASOPHILS # BLD AUTO: 0.04 THOUSANDS/ÂΜL (ref 0–0.1)
BASOPHILS NFR BLD AUTO: 1 % (ref 0–1)
BILIRUB SERPL-MCNC: 0.48 MG/DL (ref 0.2–1)
BUN SERPL-MCNC: 11 MG/DL (ref 5–25)
CALCIUM SERPL-MCNC: 9.1 MG/DL (ref 8.4–10.2)
CHLORIDE SERPL-SCNC: 102 MMOL/L (ref 96–108)
CHOLEST SERPL-MCNC: 261 MG/DL
CO2 SERPL-SCNC: 27 MMOL/L (ref 21–32)
CREAT SERPL-MCNC: 0.9 MG/DL (ref 0.6–1.3)
EOSINOPHIL # BLD AUTO: 0.18 THOUSAND/ÂΜL (ref 0–0.61)
EOSINOPHIL NFR BLD AUTO: 2 % (ref 0–6)
ERYTHROCYTE [DISTWIDTH] IN BLOOD BY AUTOMATED COUNT: 12.6 % (ref 11.6–15.1)
EST. AVERAGE GLUCOSE BLD GHB EST-MCNC: 103 MG/DL
GFR SERPL CREATININE-BSD FRML MDRD: 83 ML/MIN/1.73SQ M
GLUCOSE P FAST SERPL-MCNC: 92 MG/DL (ref 65–99)
HBA1C MFR BLD: 5.2 %
HBV SURFACE AB SER-ACNC: 129 MIU/ML
HCT VFR BLD AUTO: 38.8 % (ref 34.8–46.1)
HCV AB SER QL: NORMAL
HDLC SERPL-MCNC: 58 MG/DL
HGB BLD-MCNC: 12.6 G/DL (ref 11.5–15.4)
IMM GRANULOCYTES # BLD AUTO: 0.06 THOUSAND/UL (ref 0–0.2)
IMM GRANULOCYTES NFR BLD AUTO: 1 % (ref 0–2)
LDLC SERPL CALC-MCNC: 145 MG/DL (ref 0–100)
LYMPHOCYTES # BLD AUTO: 2.37 THOUSANDS/ÂΜL (ref 0.6–4.47)
LYMPHOCYTES NFR BLD AUTO: 31 % (ref 14–44)
MCH RBC QN AUTO: 28.5 PG (ref 26.8–34.3)
MCHC RBC AUTO-ENTMCNC: 32.5 G/DL (ref 31.4–37.4)
MCV RBC AUTO: 88 FL (ref 82–98)
MONOCYTES # BLD AUTO: 0.7 THOUSAND/ÂΜL (ref 0.17–1.22)
MONOCYTES NFR BLD AUTO: 9 % (ref 4–12)
NEUTROPHILS # BLD AUTO: 4.24 THOUSANDS/ÂΜL (ref 1.85–7.62)
NEUTS SEG NFR BLD AUTO: 56 % (ref 43–75)
NONHDLC SERPL-MCNC: 203 MG/DL
NRBC BLD AUTO-RTO: 0 /100 WBCS
PLATELET # BLD AUTO: 306 THOUSANDS/UL (ref 149–390)
PMV BLD AUTO: 9.4 FL (ref 8.9–12.7)
POTASSIUM SERPL-SCNC: 4 MMOL/L (ref 3.5–5.3)
PROT SERPL-MCNC: 7.1 G/DL (ref 6.4–8.4)
RBC # BLD AUTO: 4.42 MILLION/UL (ref 3.81–5.12)
SODIUM SERPL-SCNC: 137 MMOL/L (ref 135–147)
TRIGL SERPL-MCNC: 290 MG/DL
TSH SERPL DL<=0.05 MIU/L-ACNC: 2.29 UIU/ML (ref 0.45–4.5)
VIT B12 SERPL-MCNC: 470 PG/ML (ref 100–900)
WBC # BLD AUTO: 7.59 THOUSAND/UL (ref 4.31–10.16)

## 2023-03-23 NOTE — PROGRESS NOTES
Assessment/Plan:    1  Family history of diabetes mellitus  -     Hemoglobin A1C; Future  -     Blood Glucose Monitoring Suppl (OneTouch Verio Reflect) w/Device KIT; Check blood sugars once daily  Please substitute with appropriate alternative as covered by patient's insurance  Dx: E11 65  -     glucose blood (OneTouch Verio) test strip; Check blood sugars once daily  Please substitute with appropriate alternative as covered by patient's insurance  Dx: E11 65  -     OneTouch Delica Lancets 29S MISC; Check blood sugars once daily  Please substitute with appropriate alternative as covered by patient's insurance  Dx: E11 65    2  Family history of hyperlipidemia  -     Lipid panel; Future    3  Encounter for gynecological examination without abnormal finding  -     Liquid-based pap, screening    4  Vitamin D deficiency  -     Vitamin D 25 hydroxy; Future    5  Acne vulgaris  -     spironolactone (ALDACTONE) 25 mg tablet; Take 1 tablet (25 mg total) by mouth daily    6  Acquired hypothyroidism  -     CBC and differential; Future  -     Comprehensive metabolic panel; Future  -     Lipid panel; Future  -     TSH, 3rd generation; Future  -     levothyroxine 75 mcg tablet; Take 1 tablet (75 mcg total) by mouth daily    7  Mild episode of recurrent major depressive disorder (HCC)  -     buPROPion (WELLBUTRIN XL) 150 mg 24 hr tablet; Take 1 tablet (150 mg total) by mouth every morning  -     buPROPion (WELLBUTRIN XL) 300 mg 24 hr tablet; Take 1 tablet (300 mg total) by mouth daily    8  Screen for STD (sexually transmitted disease)  -     Hepatitis B surface antibody; Future  -     Hepatitis C antibody; Future  -     HIV-1 RNA, quantitative, PCR; Future  -     Chlamydia/GC amplified DNA by PCR; Future  -     Chlamydia/GC amplified DNA by PCR    9  Syncope and collapse  -     CBC and differential; Future  -     Hemoglobin A1C; Future  -     TSH, 3rd generation;  Future  -     Blood Glucose Monitoring Suppl (OneTouch Verio Reflect) w/Device KIT; Check blood sugars once daily  Please substitute with appropriate alternative as covered by patient's insurance  Dx: E11 65  -     glucose blood (OneTouch Verio) test strip; Check blood sugars once daily  Please substitute with appropriate alternative as covered by patient's insurance  Dx: E11 65  -     OneTouch Delica Lancets 94D MISC; Check blood sugars once daily  Please substitute with appropriate alternative as covered by patient's insurance  Dx: E11 65  -     Vitamin B12; Future    10  General counseling and advice on contraceptive management  -     etonogestrel-ethinyl estradiol (NuvaRing) 0 12-0 015 MG/24HR vaginal ring; Insert vaginally and leave in place for 3 consecutive weeks, then remove for 1 week  11  Narcolepsy due to underlying condition without cataplexy  -     Ambulatory Referral to Sleep Medicine; Future        There are no Patient Instructions on file for this visit  No follow-ups on file  Subjective:      Patient ID: Radha Schneider is a 27 y o  female  Chief Complaint   Patient presents with   • Establish Care   • Fatigue     After breakfast in morning  • Gynecologic Exam       Pt here to establish care  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  Needs refills on wellbutrin, using 450mg daily  Strong fam h/o DM and hyperlipidemia  Due for BW  Requests STD screening, had unprotected intercourse few months ago  Asks about contraception, cannot remember daily pill, asks about other options  Using aldactone for acne  Mentions she has episodes of sleepiness or near narcolepsy after eating at times  Not related to any kind of food or time of day  Does not happen after every meal  She worries about eating away from home, worries about connection to diabetes         The following portions of the patient's history were reviewed and updated as appropriate: allergies, current medications, past family history, past medical history, past social history, past surgical history and problem list     Review of Systems   Constitutional: Positive for activity change  Negative for appetite change, fatigue and fever  HENT: Negative for congestion  Eyes: Negative for visual disturbance  Respiratory: Negative for chest tightness and shortness of breath  Cardiovascular: Negative for chest pain and palpitations  Gastrointestinal: Negative for abdominal pain, constipation and diarrhea  Genitourinary: Negative for difficulty urinating, frequency, hematuria, menstrual problem and urgency  Musculoskeletal: Negative for arthralgias and myalgias  Neurological: Negative for dizziness, seizures, light-headedness and headaches  Hematological: Does not bruise/bleed easily  Psychiatric/Behavioral: Positive for sleep disturbance  Current Outpatient Medications   Medication Sig Dispense Refill   • amphetamine-dextroamphetamine (ADDERALL XR) 20 MG 24 hr capsule Take 20 mg by mouth every morning     • amphetamine-dextroamphetamine (ADDERALL) 10 mg tablet      • Blood Glucose Monitoring Suppl (OneTouch Verio Reflect) w/Device KIT Check blood sugars once daily  Please substitute with appropriate alternative as covered by patient's insurance  Dx: E11 65 1 kit 0   • buPROPion (WELLBUTRIN XL) 150 mg 24 hr tablet Take 1 tablet (150 mg total) by mouth every morning 90 tablet 3   • buPROPion (WELLBUTRIN XL) 300 mg 24 hr tablet Take 1 tablet (300 mg total) by mouth daily 90 tablet 3   • etonogestrel-ethinyl estradiol (NuvaRing) 0 12-0 015 MG/24HR vaginal ring Insert vaginally and leave in place for 3 consecutive weeks, then remove for 1 week  3 each 1   • glucose blood (OneTouch Verio) test strip Check blood sugars once daily  Please substitute with appropriate alternative as covered by patient's insurance   Dx: E11 65 100 each 3   • levothyroxine 75 mcg tablet Take 1 tablet (75 mcg total) by mouth daily 90 tablet 1   • OneTouch Delica Lancets 93T MISC Check blood sugars once daily  Please substitute with appropriate alternative as covered by patient's insurance  Dx: E11 65 100 each 3   • spironolactone (ALDACTONE) 25 mg tablet Take 1 tablet (25 mg total) by mouth daily 90 tablet 1     No current facility-administered medications for this visit  Objective:    /72 (BP Location: Right arm, Patient Position: Sitting, Cuff Size: Standard)   Pulse 76   Temp 97 8 °F (36 6 °C)   Ht 5' 5" (1 651 m)   Wt 79 7 kg (175 lb 12 8 oz)   LMP 03/01/2023   SpO2 98%   BMI 29 25 kg/m²        Physical Exam  Vitals reviewed  Constitutional:       Appearance: Normal appearance  She is well-developed  Cardiovascular:      Rate and Rhythm: Normal rate and regular rhythm  Heart sounds: Normal heart sounds  Pulmonary:      Effort: Pulmonary effort is normal       Breath sounds: Normal breath sounds  Skin:     General: Skin is warm  Neurological:      Mental Status: She is alert and oriented to person, place, and time  Psychiatric:         Mood and Affect: Mood normal          Behavior: Behavior normal          Thought Content:  Thought content normal          Judgment: Judgment normal                 Carmen Chavira MD

## 2023-03-24 LAB
C TRACH DNA SPEC QL NAA+PROBE: NEGATIVE
N GONORRHOEA DNA SPEC QL NAA+PROBE: NEGATIVE

## 2023-03-25 LAB
HIV1 RNA # SERPL NAA+PROBE: <20 COPIES/ML
HIV1 RNA SERPL NAA+PROBE-LOG#: NORMAL LOG10COPY/ML
HPV HR 12 DNA CVX QL NAA+PROBE: NEGATIVE
HPV16 DNA CVX QL NAA+PROBE: NEGATIVE
HPV18 DNA CVX QL NAA+PROBE: NEGATIVE

## 2023-04-04 LAB
LAB AP GYN PRIMARY INTERPRETATION: NORMAL
LAB AP LMP: NORMAL
Lab: NORMAL

## 2023-04-10 PROBLEM — R29.818 SUSPECTED SLEEP APNEA: Status: ACTIVE | Noted: 2023-04-10

## 2023-04-10 PROBLEM — G47.10 HYPERSOMNOLENCE: Status: ACTIVE | Noted: 2023-04-10

## 2023-04-10 PROBLEM — E55.9 VITAMIN D DEFICIENCY: Status: ACTIVE | Noted: 2023-04-10

## 2023-04-26 ENCOUNTER — TELEPHONE (OUTPATIENT)
Dept: SLEEP CENTER | Facility: CLINIC | Age: 33
End: 2023-04-26

## 2023-04-26 NOTE — TELEPHONE ENCOUNTER
Left message for patient advising sleep study resulted and does not show sleep apnea       Provided office phone number to call if she would like to schedule follow up appointment to discuss results with the doctor  Patient had consult 4/10/23 with Dr Giorgio Albert in the [facility]  Pulmonary office in [place]

## 2023-08-31 DIAGNOSIS — L70.0 ACNE VULGARIS: ICD-10-CM

## 2023-08-31 RX ORDER — SPIRONOLACTONE 25 MG/1
25 TABLET ORAL DAILY
Qty: 90 TABLET | Refills: 1 | Status: SHIPPED | OUTPATIENT
Start: 2023-08-31

## 2023-09-06 DIAGNOSIS — Z30.09 GENERAL COUNSELING AND ADVICE ON CONTRACEPTIVE MANAGEMENT: ICD-10-CM

## 2023-09-06 RX ORDER — ETONOGESTREL AND ETHINYL ESTRADIOL .12; .015 MG/D; MG/D
RING VAGINAL
Qty: 3 EACH | Refills: 1 | Status: SHIPPED | OUTPATIENT
Start: 2023-09-06

## 2024-01-16 DIAGNOSIS — F33.0 MILD EPISODE OF RECURRENT MAJOR DEPRESSIVE DISORDER (HCC): ICD-10-CM

## 2024-01-16 DIAGNOSIS — E03.9 ACQUIRED HYPOTHYROIDISM: ICD-10-CM

## 2024-01-16 DIAGNOSIS — Z30.09 GENERAL COUNSELING AND ADVICE ON CONTRACEPTIVE MANAGEMENT: ICD-10-CM

## 2024-01-16 DIAGNOSIS — L70.0 ACNE VULGARIS: ICD-10-CM

## 2024-01-16 NOTE — TELEPHONE ENCOUNTER
Pt is asking for a 3 month supply of listed meds.  [place] the [place].

## 2024-01-17 RX ORDER — LEVOTHYROXINE SODIUM 0.07 MG/1
75 TABLET ORAL DAILY
Qty: 90 TABLET | Refills: 1 | Status: SHIPPED | OUTPATIENT
Start: 2024-01-17

## 2024-01-17 RX ORDER — SPIRONOLACTONE 25 MG/1
25 TABLET ORAL DAILY
Qty: 90 TABLET | Refills: 0 | Status: SHIPPED | OUTPATIENT
Start: 2024-01-17

## 2024-01-17 RX ORDER — BUPROPION HYDROCHLORIDE 150 MG/1
150 TABLET ORAL EVERY MORNING
Qty: 90 TABLET | Refills: 0 | Status: SHIPPED | OUTPATIENT
Start: 2024-01-17

## 2024-01-17 RX ORDER — ETONOGESTREL AND ETHINYL ESTRADIOL VAGINAL RING .015; .12 MG/D; MG/D
1 RING VAGINAL
Qty: 3 EACH | Refills: 1 | Status: SHIPPED | OUTPATIENT
Start: 2024-01-17

## 2024-01-17 RX ORDER — BUPROPION HYDROCHLORIDE 300 MG/1
300 TABLET ORAL DAILY
Qty: 90 TABLET | Refills: 0 | Status: SHIPPED | OUTPATIENT
Start: 2024-01-17

## 2024-01-18 RX ORDER — DEXTROAMPHETAMINE SACCHARATE, AMPHETAMINE ASPARTATE MONOHYDRATE, DEXTROAMPHETAMINE SULFATE AND AMPHETAMINE SULFATE 5; 5; 5; 5 MG/1; MG/1; MG/1; MG/1
20 CAPSULE, EXTENDED RELEASE ORAL EVERY MORNING
Qty: 90 CAPSULE | Refills: 0 | OUTPATIENT
Start: 2024-01-18

## 2024-01-18 RX ORDER — DEXTROAMPHETAMINE SACCHARATE, AMPHETAMINE ASPARTATE, DEXTROAMPHETAMINE SULFATE AND AMPHETAMINE SULFATE 2.5; 2.5; 2.5; 2.5 MG/1; MG/1; MG/1; MG/1
TABLET ORAL
Qty: 90 TABLET | Refills: 0 | OUTPATIENT
Start: 2024-01-18

## 2024-02-08 ENCOUNTER — OFFICE VISIT (OUTPATIENT)
Dept: FAMILY MEDICINE CLINIC | Facility: CLINIC | Age: 34
End: 2024-02-08
Payer: COMMERCIAL

## 2024-02-08 VITALS
BODY MASS INDEX: 28.62 KG/M2 | HEIGHT: 65 IN | HEART RATE: 111 BPM | SYSTOLIC BLOOD PRESSURE: 120 MMHG | DIASTOLIC BLOOD PRESSURE: 74 MMHG | TEMPERATURE: 97.4 F | OXYGEN SATURATION: 98 % | WEIGHT: 171.8 LBS

## 2024-02-08 DIAGNOSIS — L70.0 ACNE VULGARIS: ICD-10-CM

## 2024-02-08 DIAGNOSIS — F90.0 ATTENTION DEFICIT HYPERACTIVITY DISORDER (ADHD), PREDOMINANTLY INATTENTIVE TYPE: ICD-10-CM

## 2024-02-08 DIAGNOSIS — E03.9 ACQUIRED HYPOTHYROIDISM: ICD-10-CM

## 2024-02-08 DIAGNOSIS — F33.0 MILD EPISODE OF RECURRENT MAJOR DEPRESSIVE DISORDER (HCC): ICD-10-CM

## 2024-02-08 DIAGNOSIS — R55 SYNCOPE AND COLLAPSE: ICD-10-CM

## 2024-02-08 DIAGNOSIS — G47.429 NARCOLEPSY DUE TO UNDERLYING CONDITION WITHOUT CATAPLEXY: Primary | ICD-10-CM

## 2024-02-08 DIAGNOSIS — H69.93 DYSFUNCTION OF BOTH EUSTACHIAN TUBES: ICD-10-CM

## 2024-02-08 DIAGNOSIS — Z83.3 FAMILY HISTORY OF DIABETES MELLITUS: ICD-10-CM

## 2024-02-08 DIAGNOSIS — E78.2 MIXED HYPERLIPIDEMIA: ICD-10-CM

## 2024-02-08 DIAGNOSIS — G47.33 OSA (OBSTRUCTIVE SLEEP APNEA): ICD-10-CM

## 2024-02-08 PROCEDURE — 99214 OFFICE O/P EST MOD 30 MIN: CPT | Performed by: FAMILY MEDICINE

## 2024-02-08 RX ORDER — BUPROPION HYDROCHLORIDE 150 MG/1
150 TABLET ORAL EVERY MORNING
Qty: 90 TABLET | Refills: 0 | Status: SHIPPED | OUTPATIENT
Start: 2024-02-08

## 2024-02-08 RX ORDER — BUPROPION HYDROCHLORIDE 300 MG/1
300 TABLET ORAL EVERY MORNING
Qty: 90 TABLET | Refills: 1 | Status: SHIPPED | OUTPATIENT
Start: 2024-02-08

## 2024-02-08 RX ORDER — DEXTROAMPHETAMINE SACCHARATE, AMPHETAMINE ASPARTATE, DEXTROAMPHETAMINE SULFATE AND AMPHETAMINE SULFATE 2.5; 2.5; 2.5; 2.5 MG/1; MG/1; MG/1; MG/1
10 TABLET ORAL DAILY
Qty: 30 TABLET | Refills: 0 | Status: SHIPPED | OUTPATIENT
Start: 2024-02-08

## 2024-02-08 RX ORDER — LEVOTHYROXINE SODIUM 0.07 MG/1
75 TABLET ORAL DAILY
Qty: 90 TABLET | Refills: 1 | Status: SHIPPED | OUTPATIENT
Start: 2024-02-08

## 2024-02-08 RX ORDER — DEXTROAMPHETAMINE SACCHARATE, AMPHETAMINE ASPARTATE MONOHYDRATE, DEXTROAMPHETAMINE SULFATE AND AMPHETAMINE SULFATE 5; 5; 5; 5 MG/1; MG/1; MG/1; MG/1
20 CAPSULE, EXTENDED RELEASE ORAL EVERY MORNING
Qty: 30 CAPSULE | Refills: 0 | Status: SHIPPED | OUTPATIENT
Start: 2024-02-08

## 2024-02-08 RX ORDER — SPIRONOLACTONE 25 MG/1
25 TABLET ORAL DAILY
Qty: 90 TABLET | Refills: 1 | Status: SHIPPED | OUTPATIENT
Start: 2024-02-08

## 2024-02-08 NOTE — PROGRESS NOTES
Assessment/Plan:    1. Narcolepsy due to underlying condition without cataplexy  -     amphetamine-dextroamphetamine (ADDERALL XR) 20 MG 24 hr capsule; Take 1 capsule (20 mg total) by mouth every morning Max Daily Amount: 20 mg  -     amphetamine-dextroamphetamine (ADDERALL) 10 mg tablet; Take 1 tablet (10 mg total) by mouth daily Max Daily Amount: 10 mg  -     CBC and differential; Future  -     Comprehensive metabolic panel; Future  -     TSH, 3rd generation; Future  -     Hemoglobin A1C; Future  -     Lipid panel; Future    2. Acquired hypothyroidism  -     levothyroxine 75 mcg tablet; Take 1 tablet (75 mcg total) by mouth daily  -     amphetamine-dextroamphetamine (ADDERALL XR) 20 MG 24 hr capsule; Take 1 capsule (20 mg total) by mouth every morning Max Daily Amount: 20 mg  -     amphetamine-dextroamphetamine (ADDERALL) 10 mg tablet; Take 1 tablet (10 mg total) by mouth daily Max Daily Amount: 10 mg  -     CBC and differential; Future  -     Comprehensive metabolic panel; Future  -     TSH, 3rd generation; Future  -     Hemoglobin A1C; Future  -     Lipid panel; Future    3. Acne vulgaris  -     spironolactone (ALDACTONE) 25 mg tablet; Take 1 tablet (25 mg total) by mouth daily    4. Mild episode of recurrent major depressive disorder (HCC)  -     buPROPion (WELLBUTRIN XL) 150 mg 24 hr tablet; Take 1 tablet (150 mg total) by mouth every morning  -     buPROPion (WELLBUTRIN XL) 300 mg 24 hr tablet; Take 1 tablet (300 mg total) by mouth every morning    5. Family history of diabetes mellitus  -     amphetamine-dextroamphetamine (ADDERALL XR) 20 MG 24 hr capsule; Take 1 capsule (20 mg total) by mouth every morning Max Daily Amount: 20 mg  -     amphetamine-dextroamphetamine (ADDERALL) 10 mg tablet; Take 1 tablet (10 mg total) by mouth daily Max Daily Amount: 10 mg  -     CBC and differential; Future  -     Comprehensive metabolic panel; Future  -     TSH, 3rd generation; Future  -     Hemoglobin A1C; Future  -      Lipid panel; Future    6. Syncope and collapse    7. Mixed hyperlipidemia  -     amphetamine-dextroamphetamine (ADDERALL XR) 20 MG 24 hr capsule; Take 1 capsule (20 mg total) by mouth every morning Max Daily Amount: 20 mg  -     amphetamine-dextroamphetamine (ADDERALL) 10 mg tablet; Take 1 tablet (10 mg total) by mouth daily Max Daily Amount: 10 mg  -     CBC and differential; Future  -     Comprehensive metabolic panel; Future  -     TSH, 3rd generation; Future  -     Hemoglobin A1C; Future  -     Lipid panel; Future    8. THUY (obstructive sleep apnea)  -     amphetamine-dextroamphetamine (ADDERALL XR) 20 MG 24 hr capsule; Take 1 capsule (20 mg total) by mouth every morning Max Daily Amount: 20 mg  -     amphetamine-dextroamphetamine (ADDERALL) 10 mg tablet; Take 1 tablet (10 mg total) by mouth daily Max Daily Amount: 10 mg    9. Attention deficit hyperactivity disorder (ADHD), predominantly inattentive type  -     amphetamine-dextroamphetamine (ADDERALL XR) 20 MG 24 hr capsule; Take 1 capsule (20 mg total) by mouth every morning Max Daily Amount: 20 mg  -     amphetamine-dextroamphetamine (ADDERALL) 10 mg tablet; Take 1 tablet (10 mg total) by mouth daily Max Daily Amount: 10 mg    10. Dysfunction of both eustachian tubes        There are no Patient Instructions on file for this visit.    Return in about 6 months (around 8/8/2024).    Subjective:      Patient ID: Reynaldo Graves is a 33 y.o. female.    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 Will be there for another 5 months. Follows with sleep med who started her on Adderall for narcolepsy and ADD. She had paradoxical response with modafinil. BP controlled. Mood controlled on wellbutrin 450mg daily.         The following portions of the patient's history were reviewed and updated as appropriate: allergies, current medications, past family history, past  medical history, past social history, past surgical history and problem list.    Review of Systems   Constitutional:  Negative for fatigue and fever.   HENT:  Positive for ear pain. Negative for congestion.    Eyes:  Negative for visual disturbance.   Respiratory:  Negative for chest tightness and shortness of breath.    Cardiovascular:  Negative for chest pain and palpitations.   Gastrointestinal:  Negative for abdominal pain.   Genitourinary:  Negative for difficulty urinating.   Musculoskeletal:  Negative for arthralgias.   Neurological:  Negative for headaches.   Hematological:  Does not bruise/bleed easily.   Psychiatric/Behavioral:  Positive for decreased concentration and sleep disturbance. Negative for dysphoric mood.          Current Outpatient Medications   Medication Sig Dispense Refill    amphetamine-dextroamphetamine (ADDERALL XR) 20 MG 24 hr capsule Take 1 capsule (20 mg total) by mouth every morning Max Daily Amount: 20 mg 30 capsule 0    amphetamine-dextroamphetamine (ADDERALL) 10 mg tablet Take 1 tablet (10 mg total) by mouth daily Max Daily Amount: 10 mg 30 tablet 0    Blood Glucose Monitoring Suppl (OneTouch Verio Reflect) w/Device KIT Check blood sugars once daily. Please substitute with appropriate alternative as covered by patient's insurance. Dx: E11.65 1 kit 0    buPROPion (WELLBUTRIN XL) 150 mg 24 hr tablet Take 1 tablet (150 mg total) by mouth every morning 90 tablet 0    buPROPion (WELLBUTRIN XL) 300 mg 24 hr tablet Take 1 tablet (300 mg total) by mouth every morning 90 tablet 1    etonogestrel-ethinyl estradiol (EluRyng) 0.12-0.015 MG/24HR vaginal ring Insert 1 each into the vagina every 28 days Insert vaginally and leave in place for 3 consecutive weeks, then remove for 1 week. 3 each 1    glucose blood (OneTouch Verio) test strip Check blood sugars once daily. Please substitute with appropriate alternative as covered by patient's insurance. Dx: E11.65 100 each 3    levothyroxine 75 mcg  "tablet Take 1 tablet (75 mcg total) by mouth daily 90 tablet 1    OneTouch Delica Lancets 33G MISC Check blood sugars once daily. Please substitute with appropriate alternative as covered by patient's insurance. Dx: E11.65 100 each 3    spironolactone (ALDACTONE) 25 mg tablet Take 1 tablet (25 mg total) by mouth daily 90 tablet 1    ergocalciferol (VITAMIN D2) 50,000 units Take 1 capsule (50,000 Units total) by mouth once a week for 8 doses 8 capsule 0     No current facility-administered medications for this visit.       Objective:    /74 (BP Location: Left arm, Patient Position: Sitting, Cuff Size: Large)   Pulse (!) 111   Temp (!) 97.4 °F (36.3 °C) (Temporal)   Ht 5' 5\" (1.651 m)   Wt 77.9 kg (171 lb 12.8 oz)   LMP 01/07/2024 (Approximate)   SpO2 98%   BMI 28.59 kg/m²        Physical Exam  Vitals reviewed.   Constitutional:       Appearance: Normal appearance. She is well-developed.   HENT:      Ears:      Comments: Still boggy but redness improved  Cardiovascular:      Rate and Rhythm: Normal rate and regular rhythm.      Heart sounds: Normal heart sounds.   Pulmonary:      Effort: Pulmonary effort is normal.      Breath sounds: Normal breath sounds.   Skin:     General: Skin is warm.   Neurological:      General: No focal deficit present.      Mental Status: She is alert and oriented to person, place, and time.   Psychiatric:         Mood and Affect: Mood normal.         Behavior: Behavior normal.         Thought Content: Thought content normal.         Judgment: Judgment normal.                Radha Wesley MD  "

## 2024-02-19 ENCOUNTER — APPOINTMENT (OUTPATIENT)
Dept: LAB | Facility: AMBULARY SURGERY CENTER | Age: 34
End: 2024-02-19
Payer: COMMERCIAL

## 2024-02-19 DIAGNOSIS — E03.9 ACQUIRED HYPOTHYROIDISM: ICD-10-CM

## 2024-02-19 DIAGNOSIS — Z83.3 FAMILY HISTORY OF DIABETES MELLITUS: ICD-10-CM

## 2024-02-19 DIAGNOSIS — E78.2 MIXED HYPERLIPIDEMIA: ICD-10-CM

## 2024-02-19 DIAGNOSIS — G47.429 NARCOLEPSY DUE TO UNDERLYING CONDITION WITHOUT CATAPLEXY: ICD-10-CM

## 2024-02-19 LAB
ALBUMIN SERPL BCP-MCNC: 4.3 G/DL (ref 3.5–5)
ALP SERPL-CCNC: 60 U/L (ref 34–104)
ALT SERPL W P-5'-P-CCNC: 28 U/L (ref 7–52)
ANION GAP SERPL CALCULATED.3IONS-SCNC: 10 MMOL/L
AST SERPL W P-5'-P-CCNC: 27 U/L (ref 13–39)
BASOPHILS # BLD AUTO: 0.04 THOUSANDS/ÂΜL (ref 0–0.1)
BASOPHILS NFR BLD AUTO: 1 % (ref 0–1)
BILIRUB SERPL-MCNC: 0.73 MG/DL (ref 0.2–1)
BUN SERPL-MCNC: 10 MG/DL (ref 5–25)
CALCIUM SERPL-MCNC: 9.1 MG/DL (ref 8.4–10.2)
CHLORIDE SERPL-SCNC: 103 MMOL/L (ref 96–108)
CHOLEST SERPL-MCNC: 297 MG/DL
CO2 SERPL-SCNC: 24 MMOL/L (ref 21–32)
CREAT SERPL-MCNC: 0.93 MG/DL (ref 0.6–1.3)
EOSINOPHIL # BLD AUTO: 0.12 THOUSAND/ÂΜL (ref 0–0.61)
EOSINOPHIL NFR BLD AUTO: 2 % (ref 0–6)
ERYTHROCYTE [DISTWIDTH] IN BLOOD BY AUTOMATED COUNT: 12.5 % (ref 11.6–15.1)
EST. AVERAGE GLUCOSE BLD GHB EST-MCNC: 117 MG/DL
GFR SERPL CREATININE-BSD FRML MDRD: 79 ML/MIN/1.73SQ M
GLUCOSE P FAST SERPL-MCNC: 89 MG/DL (ref 65–99)
HBA1C MFR BLD: 5.7 %
HCT VFR BLD AUTO: 39.4 % (ref 34.8–46.1)
HDLC SERPL-MCNC: 73 MG/DL
HGB BLD-MCNC: 12.9 G/DL (ref 11.5–15.4)
IMM GRANULOCYTES # BLD AUTO: 0.08 THOUSAND/UL (ref 0–0.2)
IMM GRANULOCYTES NFR BLD AUTO: 1 % (ref 0–2)
LDLC SERPL CALC-MCNC: 195 MG/DL (ref 0–100)
LYMPHOCYTES # BLD AUTO: 2.09 THOUSANDS/ÂΜL (ref 0.6–4.47)
LYMPHOCYTES NFR BLD AUTO: 27 % (ref 14–44)
MCH RBC QN AUTO: 29.1 PG (ref 26.8–34.3)
MCHC RBC AUTO-ENTMCNC: 32.7 G/DL (ref 31.4–37.4)
MCV RBC AUTO: 89 FL (ref 82–98)
MONOCYTES # BLD AUTO: 0.74 THOUSAND/ÂΜL (ref 0.17–1.22)
MONOCYTES NFR BLD AUTO: 10 % (ref 4–12)
NEUTROPHILS # BLD AUTO: 4.61 THOUSANDS/ÂΜL (ref 1.85–7.62)
NEUTS SEG NFR BLD AUTO: 59 % (ref 43–75)
NONHDLC SERPL-MCNC: 224 MG/DL
NRBC BLD AUTO-RTO: 0 /100 WBCS
PLATELET # BLD AUTO: 352 THOUSANDS/UL (ref 149–390)
PMV BLD AUTO: 10.1 FL (ref 8.9–12.7)
POTASSIUM SERPL-SCNC: 3.8 MMOL/L (ref 3.5–5.3)
PROT SERPL-MCNC: 7.5 G/DL (ref 6.4–8.4)
RBC # BLD AUTO: 4.44 MILLION/UL (ref 3.81–5.12)
SODIUM SERPL-SCNC: 137 MMOL/L (ref 135–147)
TRIGL SERPL-MCNC: 146 MG/DL
TSH SERPL DL<=0.05 MIU/L-ACNC: 2.7 UIU/ML (ref 0.45–4.5)
WBC # BLD AUTO: 7.68 THOUSAND/UL (ref 4.31–10.16)

## 2024-02-19 PROCEDURE — 80061 LIPID PANEL: CPT

## 2024-02-19 PROCEDURE — 83036 HEMOGLOBIN GLYCOSYLATED A1C: CPT

## 2024-02-19 PROCEDURE — 84443 ASSAY THYROID STIM HORMONE: CPT

## 2024-02-19 PROCEDURE — 85025 COMPLETE CBC W/AUTO DIFF WBC: CPT

## 2024-02-19 PROCEDURE — 36415 COLL VENOUS BLD VENIPUNCTURE: CPT

## 2024-02-19 PROCEDURE — 80053 COMPREHEN METABOLIC PANEL: CPT

## 2024-02-22 DIAGNOSIS — E78.2 MIXED HYPERLIPIDEMIA: Primary | ICD-10-CM

## 2024-02-22 RX ORDER — ROSUVASTATIN CALCIUM 5 MG/1
5 TABLET, COATED ORAL DAILY
Qty: 90 TABLET | Refills: 3 | Status: SHIPPED | OUTPATIENT
Start: 2024-02-22

## 2024-04-09 ENCOUNTER — TELEPHONE (OUTPATIENT)
Dept: FAMILY MEDICINE CLINIC | Facility: CLINIC | Age: 34
End: 2024-04-09

## 2024-08-08 ENCOUNTER — OFFICE VISIT (OUTPATIENT)
Dept: FAMILY MEDICINE CLINIC | Facility: CLINIC | Age: 34
End: 2024-08-08
Payer: COMMERCIAL

## 2024-08-08 VITALS
WEIGHT: 173 LBS | TEMPERATURE: 97.6 F | BODY MASS INDEX: 28.82 KG/M2 | OXYGEN SATURATION: 100 % | SYSTOLIC BLOOD PRESSURE: 114 MMHG | DIASTOLIC BLOOD PRESSURE: 72 MMHG | HEART RATE: 86 BPM | HEIGHT: 65 IN

## 2024-08-08 DIAGNOSIS — F90.0 ATTENTION DEFICIT HYPERACTIVITY DISORDER (ADHD), PREDOMINANTLY INATTENTIVE TYPE: ICD-10-CM

## 2024-08-08 DIAGNOSIS — R73.9 HYPERGLYCEMIA: Primary | ICD-10-CM

## 2024-08-08 DIAGNOSIS — G47.429 NARCOLEPSY DUE TO UNDERLYING CONDITION WITHOUT CATAPLEXY: ICD-10-CM

## 2024-08-08 DIAGNOSIS — E03.9 ACQUIRED HYPOTHYROIDISM: ICD-10-CM

## 2024-08-08 DIAGNOSIS — E78.2 MIXED HYPERLIPIDEMIA: ICD-10-CM

## 2024-08-08 DIAGNOSIS — G47.33 OSA (OBSTRUCTIVE SLEEP APNEA): ICD-10-CM

## 2024-08-08 DIAGNOSIS — Z83.3 FAMILY HISTORY OF DIABETES MELLITUS: ICD-10-CM

## 2024-08-08 PROCEDURE — 99214 OFFICE O/P EST MOD 30 MIN: CPT | Performed by: FAMILY MEDICINE

## 2024-08-08 RX ORDER — DEXTROAMPHETAMINE SACCHARATE, AMPHETAMINE ASPARTATE MONOHYDRATE, DEXTROAMPHETAMINE SULFATE AND AMPHETAMINE SULFATE 5; 5; 5; 5 MG/1; MG/1; MG/1; MG/1
20 CAPSULE, EXTENDED RELEASE ORAL EVERY MORNING
Qty: 30 CAPSULE | Refills: 0 | Status: SHIPPED | OUTPATIENT
Start: 2024-08-08

## 2024-08-08 RX ORDER — DEXTROAMPHETAMINE SACCHARATE, AMPHETAMINE ASPARTATE, DEXTROAMPHETAMINE SULFATE AND AMPHETAMINE SULFATE 2.5; 2.5; 2.5; 2.5 MG/1; MG/1; MG/1; MG/1
10 TABLET ORAL DAILY
Qty: 30 TABLET | Refills: 0 | Status: SHIPPED | OUTPATIENT
Start: 2024-08-08

## 2024-08-08 NOTE — PROGRESS NOTES
Assessment/Plan:    1. Hyperglycemia  -     Hemoglobin A1C; Future  2. Acquired hypothyroidism  -     CBC and differential; Future  -     Comprehensive metabolic panel; Future  -     TSH, 3rd generation; Future  -     Lipid panel; Future  -     amphetamine-dextroamphetamine (ADDERALL XR) 20 MG 24 hr capsule; Take 1 capsule (20 mg total) by mouth every morning Max Daily Amount: 20 mg  -     amphetamine-dextroamphetamine (ADDERALL) 10 mg tablet; Take 1 tablet (10 mg total) by mouth daily Max Daily Amount: 10 mg  3. Family history of diabetes mellitus  -     amphetamine-dextroamphetamine (ADDERALL XR) 20 MG 24 hr capsule; Take 1 capsule (20 mg total) by mouth every morning Max Daily Amount: 20 mg  -     amphetamine-dextroamphetamine (ADDERALL) 10 mg tablet; Take 1 tablet (10 mg total) by mouth daily Max Daily Amount: 10 mg  4. Narcolepsy due to underlying condition without cataplexy  -     amphetamine-dextroamphetamine (ADDERALL XR) 20 MG 24 hr capsule; Take 1 capsule (20 mg total) by mouth every morning Max Daily Amount: 20 mg  -     amphetamine-dextroamphetamine (ADDERALL) 10 mg tablet; Take 1 tablet (10 mg total) by mouth daily Max Daily Amount: 10 mg  5. Mixed hyperlipidemia  -     CBC and differential; Future  -     Comprehensive metabolic panel; Future  -     TSH, 3rd generation; Future  -     Lipid panel; Future  -     amphetamine-dextroamphetamine (ADDERALL XR) 20 MG 24 hr capsule; Take 1 capsule (20 mg total) by mouth every morning Max Daily Amount: 20 mg  -     amphetamine-dextroamphetamine (ADDERALL) 10 mg tablet; Take 1 tablet (10 mg total) by mouth daily Max Daily Amount: 10 mg  6. THUY (obstructive sleep apnea)  -     amphetamine-dextroamphetamine (ADDERALL XR) 20 MG 24 hr capsule; Take 1 capsule (20 mg total) by mouth every morning Max Daily Amount: 20 mg  -     amphetamine-dextroamphetamine (ADDERALL) 10 mg tablet; Take 1 tablet (10 mg total) by mouth daily Max Daily Amount: 10 mg  7. Attention deficit  hyperactivity disorder (ADHD), predominantly inattentive type  -     amphetamine-dextroamphetamine (ADDERALL XR) 20 MG 24 hr capsule; Take 1 capsule (20 mg total) by mouth every morning Max Daily Amount: 20 mg  -     amphetamine-dextroamphetamine (ADDERALL) 10 mg tablet; Take 1 tablet (10 mg total) by mouth daily Max Daily Amount: 10 mg      Patient Instructions   Needs  to help with bruxism, contributing to ear and jaw pain  Apply HC cream to the R arm to help clear the dermatitis  Check labs to see response from statin  Consider adjusting thyroid dose to help with flare pending TSH  Back exercises for low back pain      Return in about 6 months (around 2/8/2025).    Subjective:      Patient ID: Reynaldo Graves is a 34 y.o. female.    Chief Complaint   Patient presents with    Follow-up     6 month follow up    Earache     Right ear into jaw pain for 2-3 days    Rash     Itchy burning rash on skin started in July     stiff thumb and tips of fingers     Thumbs since January finger slowly progressing     Back Pain     This past month        Here for f/u, after last visit she started statin, due for lipid recheck. She notes some flare's with deQuervain, tightness in the fingers, brain fog. Irritation dermatitis on the forearms. Tried nizoral and cooler showers. Returned form [place] in June, rash started after she got back to the [facility]. She took zyrtec while in [place] as she is prone to reactions. She had undergone densensitization therapy in 2016, but occ has flares and keeps zyrtec on hand.  She stopped taking it upon return home. Notes ear pain R>L, drainage from the left.     Earache   Associated symptoms include ear discharge and a rash. Pertinent negatives include no abdominal pain or headaches.   Rash  Pertinent negatives include no congestion, fatigue, fever or shortness of breath.   Back Pain  Pertinent negatives include no abdominal pain, chest pain, fever or headaches.       The following portions of  the patient's history were reviewed and updated as appropriate: allergies, current medications, past family history, past medical history, past social history, past surgical history and problem list.    Review of Systems   Constitutional:  Negative for fatigue and fever.   HENT:  Positive for ear discharge and ear pain. Negative for congestion.    Eyes:  Negative for visual disturbance.   Respiratory:  Negative for chest tightness and shortness of breath.    Cardiovascular:  Negative for chest pain and palpitations.   Gastrointestinal:  Negative for abdominal pain.   Genitourinary:  Negative for difficulty urinating.   Musculoskeletal:  Positive for back pain. Negative for arthralgias.   Skin:  Positive for rash.   Neurological:  Negative for headaches.   Hematological:  Does not bruise/bleed easily.         Current Outpatient Medications   Medication Sig Dispense Refill    amphetamine-dextroamphetamine (ADDERALL XR) 20 MG 24 hr capsule Take 1 capsule (20 mg total) by mouth every morning Max Daily Amount: 20 mg 30 capsule 0    amphetamine-dextroamphetamine (ADDERALL) 10 mg tablet Take 1 tablet (10 mg total) by mouth daily Max Daily Amount: 10 mg 30 tablet 0    Blood Glucose Monitoring Suppl (OneTouch Verio Reflect) w/Device KIT Check blood sugars once daily. Please substitute with appropriate alternative as covered by patient's insurance. Dx: E11.65 1 kit 0    buPROPion (WELLBUTRIN XL) 150 mg 24 hr tablet Take 1 tablet (150 mg total) by mouth every morning 90 tablet 0    buPROPion (WELLBUTRIN XL) 300 mg 24 hr tablet Take 1 tablet (300 mg total) by mouth every morning 90 tablet 1    etonogestrel-ethinyl estradiol (EluRyng) 0.12-0.015 MG/24HR vaginal ring Insert 1 each into the vagina every 28 days Insert vaginally and leave in place for 3 consecutive weeks, then remove for 1 week. 3 each 1    glucose blood (OneTouch Verio) test strip Check blood sugars once daily. Please substitute with appropriate alternative as  "covered by patient's insurance. Dx: E11.65 100 each 3    levothyroxine 75 mcg tablet Take 1 tablet (75 mcg total) by mouth daily 90 tablet 1    OneTouch Delica Lancets 33G MISC Check blood sugars once daily. Please substitute with appropriate alternative as covered by patient's insurance. Dx: E11.65 100 each 3    rosuvastatin (CRESTOR) 5 mg tablet Take 1 tablet (5 mg total) by mouth daily 90 tablet 3    spironolactone (ALDACTONE) 25 mg tablet Take 1 tablet (25 mg total) by mouth daily 90 tablet 1    ergocalciferol (VITAMIN D2) 50,000 units Take 1 capsule (50,000 Units total) by mouth once a week for 8 doses (Patient not taking: Reported on 8/8/2024) 8 capsule 0     No current facility-administered medications for this visit.       Objective:    /72 (BP Location: Right arm, Patient Position: Sitting, Cuff Size: Standard)   Pulse 86   Temp 97.6 °F (36.4 °C) (Temporal)   Ht 5' 5\" (1.651 m)   Wt 78.5 kg (173 lb)   LMP 07/17/2024 (Approximate)   SpO2 100%   Breastfeeding No   BMI 28.79 kg/m²        Physical Exam  Vitals reviewed.   Constitutional:       Appearance: Normal appearance. She is well-developed.   HENT:      Right Ear: Ear canal and external ear normal.      Left Ear: Tympanic membrane, ear canal and external ear normal.      Ears:      Comments: R TM bulging  Cardiovascular:      Rate and Rhythm: Normal rate and regular rhythm.      Heart sounds: Normal heart sounds.   Pulmonary:      Effort: Pulmonary effort is normal.      Breath sounds: Normal breath sounds.   Skin:     General: Skin is warm.   Neurological:      General: No focal deficit present.      Mental Status: She is alert and oriented to person, place, and time.   Psychiatric:         Mood and Affect: Mood normal.         Behavior: Behavior normal.         Thought Content: Thought content normal.         Judgment: Judgment normal.                Radha Wesley MD  "

## 2024-08-08 NOTE — PATIENT INSTRUCTIONS
Needs  to help with bruxism, contributing to ear and jaw pain  Apply HC cream to the R arm to help clear the dermatitis  Check labs to see response from statin  Consider adjusting thyroid dose to help with flare pending TSH  Back exercises for low back pain

## 2024-08-23 DIAGNOSIS — E03.9 ACQUIRED HYPOTHYROIDISM: ICD-10-CM

## 2024-08-23 DIAGNOSIS — F33.0 MILD EPISODE OF RECURRENT MAJOR DEPRESSIVE DISORDER (HCC): ICD-10-CM

## 2024-08-23 RX ORDER — BUPROPION HYDROCHLORIDE 300 MG/1
300 TABLET ORAL EVERY MORNING
Qty: 90 TABLET | Refills: 1 | Status: SHIPPED | OUTPATIENT
Start: 2024-08-23

## 2024-08-23 RX ORDER — LEVOTHYROXINE SODIUM 75 UG/1
75 TABLET ORAL DAILY
Qty: 90 TABLET | Refills: 1 | Status: SHIPPED | OUTPATIENT
Start: 2024-08-23

## 2024-09-12 ENCOUNTER — APPOINTMENT (OUTPATIENT)
Dept: LAB | Facility: CLINIC | Age: 34
End: 2024-09-12
Payer: COMMERCIAL

## 2024-09-12 DIAGNOSIS — R73.9 HYPERGLYCEMIA: ICD-10-CM

## 2024-09-12 DIAGNOSIS — E78.2 MIXED HYPERLIPIDEMIA: ICD-10-CM

## 2024-09-12 DIAGNOSIS — E03.9 ACQUIRED HYPOTHYROIDISM: ICD-10-CM

## 2024-09-12 LAB
ALBUMIN SERPL BCG-MCNC: 4.2 G/DL (ref 3.5–5)
ALP SERPL-CCNC: 65 U/L (ref 34–104)
ALT SERPL W P-5'-P-CCNC: 24 U/L (ref 7–52)
ANION GAP SERPL CALCULATED.3IONS-SCNC: 8 MMOL/L (ref 4–13)
AST SERPL W P-5'-P-CCNC: 18 U/L (ref 13–39)
BASOPHILS # BLD AUTO: 0.04 THOUSANDS/ΜL (ref 0–0.1)
BASOPHILS NFR BLD AUTO: 1 % (ref 0–1)
BILIRUB SERPL-MCNC: 0.58 MG/DL (ref 0.2–1)
BUN SERPL-MCNC: 12 MG/DL (ref 5–25)
CALCIUM SERPL-MCNC: 9.2 MG/DL (ref 8.4–10.2)
CHLORIDE SERPL-SCNC: 102 MMOL/L (ref 96–108)
CHOLEST SERPL-MCNC: 221 MG/DL
CO2 SERPL-SCNC: 27 MMOL/L (ref 21–32)
CREAT SERPL-MCNC: 0.9 MG/DL (ref 0.6–1.3)
EOSINOPHIL # BLD AUTO: 0.19 THOUSAND/ΜL (ref 0–0.61)
EOSINOPHIL NFR BLD AUTO: 3 % (ref 0–6)
ERYTHROCYTE [DISTWIDTH] IN BLOOD BY AUTOMATED COUNT: 12 % (ref 11.6–15.1)
EST. AVERAGE GLUCOSE BLD GHB EST-MCNC: 117 MG/DL
GFR SERPL CREATININE-BSD FRML MDRD: 82 ML/MIN/1.73SQ M
GLUCOSE P FAST SERPL-MCNC: 98 MG/DL (ref 65–99)
HBA1C MFR BLD: 5.7 %
HCT VFR BLD AUTO: 42.1 % (ref 34.8–46.1)
HDLC SERPL-MCNC: 61 MG/DL
HGB BLD-MCNC: 14 G/DL (ref 11.5–15.4)
IMM GRANULOCYTES # BLD AUTO: 0.06 THOUSAND/UL (ref 0–0.2)
IMM GRANULOCYTES NFR BLD AUTO: 1 % (ref 0–2)
LDLC SERPL CALC-MCNC: 120 MG/DL (ref 0–100)
LYMPHOCYTES # BLD AUTO: 1.84 THOUSANDS/ΜL (ref 0.6–4.47)
LYMPHOCYTES NFR BLD AUTO: 24 % (ref 14–44)
MCH RBC QN AUTO: 28.9 PG (ref 26.8–34.3)
MCHC RBC AUTO-ENTMCNC: 33.3 G/DL (ref 31.4–37.4)
MCV RBC AUTO: 87 FL (ref 82–98)
MONOCYTES # BLD AUTO: 0.68 THOUSAND/ΜL (ref 0.17–1.22)
MONOCYTES NFR BLD AUTO: 9 % (ref 4–12)
NEUTROPHILS # BLD AUTO: 4.74 THOUSANDS/ΜL (ref 1.85–7.62)
NEUTS SEG NFR BLD AUTO: 62 % (ref 43–75)
NONHDLC SERPL-MCNC: 160 MG/DL
NRBC BLD AUTO-RTO: 0 /100 WBCS
PLATELET # BLD AUTO: 345 THOUSANDS/UL (ref 149–390)
PMV BLD AUTO: 9.5 FL (ref 8.9–12.7)
POTASSIUM SERPL-SCNC: 4.4 MMOL/L (ref 3.5–5.3)
PROT SERPL-MCNC: 7.5 G/DL (ref 6.4–8.4)
RBC # BLD AUTO: 4.85 MILLION/UL (ref 3.81–5.12)
SODIUM SERPL-SCNC: 137 MMOL/L (ref 135–147)
TRIGL SERPL-MCNC: 198 MG/DL
TSH SERPL DL<=0.05 MIU/L-ACNC: 2.11 UIU/ML (ref 0.45–4.5)
WBC # BLD AUTO: 7.55 THOUSAND/UL (ref 4.31–10.16)

## 2024-09-12 PROCEDURE — 80053 COMPREHEN METABOLIC PANEL: CPT

## 2024-09-12 PROCEDURE — 80061 LIPID PANEL: CPT

## 2024-09-12 PROCEDURE — 84443 ASSAY THYROID STIM HORMONE: CPT

## 2024-09-12 PROCEDURE — 36415 COLL VENOUS BLD VENIPUNCTURE: CPT

## 2024-09-12 PROCEDURE — 83036 HEMOGLOBIN GLYCOSYLATED A1C: CPT

## 2024-09-12 PROCEDURE — 85025 COMPLETE CBC W/AUTO DIFF WBC: CPT

## 2024-10-19 DIAGNOSIS — F41.9 ANXIETY: Primary | ICD-10-CM

## 2024-10-19 RX ORDER — LORAZEPAM 1 MG/1
0.5 TABLET ORAL EVERY 8 HOURS PRN
Status: DISCONTINUED | OUTPATIENT
Start: 2024-10-19 | End: 2024-10-19

## 2024-10-19 RX ORDER — LORAZEPAM 0.5 MG/1
0.5 TABLET ORAL
Qty: 10 TABLET | Refills: 0 | Status: SHIPPED | OUTPATIENT
Start: 2024-10-19 | End: 2024-10-29

## 2024-11-24 DIAGNOSIS — Z30.09 GENERAL COUNSELING AND ADVICE ON CONTRACEPTIVE MANAGEMENT: ICD-10-CM

## 2024-11-26 RX ORDER — ETONOGESTREL AND ETHINYL ESTRADIOL .12; .015 MG/D; MG/D
RING VAGINAL
Qty: 3 EACH | Refills: 1 | Status: SHIPPED | OUTPATIENT
Start: 2024-11-26

## 2024-12-20 DIAGNOSIS — F33.0 MILD EPISODE OF RECURRENT MAJOR DEPRESSIVE DISORDER (HCC): ICD-10-CM

## 2024-12-23 RX ORDER — BUPROPION HYDROCHLORIDE 150 MG/1
150 TABLET ORAL EVERY MORNING
Qty: 90 TABLET | Refills: 1 | Status: SHIPPED | OUTPATIENT
Start: 2024-12-23

## 2025-02-05 DIAGNOSIS — F33.0 MILD EPISODE OF RECURRENT MAJOR DEPRESSIVE DISORDER (HCC): ICD-10-CM

## 2025-02-05 DIAGNOSIS — E78.2 MIXED HYPERLIPIDEMIA: ICD-10-CM

## 2025-02-06 RX ORDER — ROSUVASTATIN CALCIUM 5 MG/1
5 TABLET, COATED ORAL DAILY
Qty: 90 TABLET | Refills: 1 | Status: SHIPPED | OUTPATIENT
Start: 2025-02-06

## 2025-02-08 RX ORDER — BUPROPION HYDROCHLORIDE 300 MG/1
300 TABLET ORAL EVERY MORNING
Qty: 90 TABLET | Refills: 1 | Status: SHIPPED | OUTPATIENT
Start: 2025-02-08

## 2025-02-17 ENCOUNTER — TELEPHONE (OUTPATIENT)
Age: 35
End: 2025-02-17

## 2025-02-17 NOTE — TELEPHONE ENCOUNTER
Patient called back , I did inform the reason for Silvana's call , patient accepted to be scheduled with Aleksey .   Unable to schedule the appointment with Joy , [facility] does not let me change it .   Please assist.

## 2025-02-17 NOTE — TELEPHONE ENCOUNTER
LM for patient if its okay to move appt 4/8/25 from Dr Good appt to ROBYN Ryder's schedule in [place] If patient calls back please offer an appt on Aleksey's schedule for the same date and time.

## 2025-02-25 ENCOUNTER — APPOINTMENT (OUTPATIENT)
Dept: LAB | Facility: MEDICAL CENTER | Age: 35
End: 2025-02-25

## 2025-02-25 ENCOUNTER — OCCMED (OUTPATIENT)
Dept: URGENT CARE | Facility: MEDICAL CENTER | Age: 35
End: 2025-02-25

## 2025-02-25 DIAGNOSIS — Z02.1 PHYSICAL EXAM, PRE-EMPLOYMENT: Primary | ICD-10-CM

## 2025-02-25 LAB — RUBV IGG SERPL IA-ACNC: 36.8 IU/ML

## 2025-02-25 PROCEDURE — 36415 COLL VENOUS BLD VENIPUNCTURE: CPT | Performed by: PHYSICIAN ASSISTANT

## 2025-02-25 PROCEDURE — 86765 RUBEOLA ANTIBODY: CPT | Performed by: PHYSICIAN ASSISTANT

## 2025-02-25 PROCEDURE — 86735 MUMPS ANTIBODY: CPT | Performed by: PHYSICIAN ASSISTANT

## 2025-02-25 PROCEDURE — 86480 TB TEST CELL IMMUN MEASURE: CPT | Performed by: PHYSICIAN ASSISTANT

## 2025-02-25 PROCEDURE — 86762 RUBELLA ANTIBODY: CPT | Performed by: PHYSICIAN ASSISTANT

## 2025-02-25 PROCEDURE — 86787 VARICELLA-ZOSTER ANTIBODY: CPT | Performed by: PHYSICIAN ASSISTANT

## 2025-02-26 ENCOUNTER — OFFICE VISIT (OUTPATIENT)
Dept: FAMILY MEDICINE CLINIC | Facility: CLINIC | Age: 35
End: 2025-02-26

## 2025-02-26 VITALS
OXYGEN SATURATION: 98 % | HEIGHT: 65 IN | TEMPERATURE: 97.7 F | SYSTOLIC BLOOD PRESSURE: 122 MMHG | WEIGHT: 193.2 LBS | BODY MASS INDEX: 32.19 KG/M2 | HEART RATE: 113 BPM | DIASTOLIC BLOOD PRESSURE: 82 MMHG

## 2025-02-26 DIAGNOSIS — E03.9 ACQUIRED HYPOTHYROIDISM: ICD-10-CM

## 2025-02-26 DIAGNOSIS — E78.2 MIXED HYPERLIPIDEMIA: ICD-10-CM

## 2025-02-26 DIAGNOSIS — F33.0 MILD EPISODE OF RECURRENT MAJOR DEPRESSIVE DISORDER (HCC): Primary | ICD-10-CM

## 2025-02-26 DIAGNOSIS — F90.0 ATTENTION DEFICIT HYPERACTIVITY DISORDER (ADHD), PREDOMINANTLY INATTENTIVE TYPE: ICD-10-CM

## 2025-02-26 LAB
GAMMA INTERFERON BACKGROUND BLD IA-ACNC: 0.09 IU/ML
M TB IFN-G BLD-IMP: NEGATIVE
M TB IFN-G CD4+ BCKGRND COR BLD-ACNC: -0.01 IU/ML
M TB IFN-G CD4+ BCKGRND COR BLD-ACNC: -0.02 IU/ML
MEV IGG SER QL IA: >300 AU/ML
MEV IGG SER QL IA: POSITIVE
MITOGEN IGNF BCKGRD COR BLD-ACNC: 9.91 IU/ML
MUV IGG SER QL IA: >300 AU/ML
MUV IGG SER QL IA: POSITIVE
VZV IGG SER QL IA: 9.81 S/CO
VZV IGG SER QL IA: POSITIVE

## 2025-02-26 PROCEDURE — 99213 OFFICE O/P EST LOW 20 MIN: CPT | Performed by: FAMILY MEDICINE

## 2025-02-26 RX ORDER — LEVOTHYROXINE SODIUM 75 UG/1
75 TABLET ORAL DAILY
Qty: 90 TABLET | Refills: 1 | Status: SHIPPED | OUTPATIENT
Start: 2025-02-26

## 2025-02-26 NOTE — PROGRESS NOTES
"Name: Reynaldo Graves      : 1990      MRN: [id number]  Encounter Provider: Radha Wesley MD  Encounter Date: 2025   Encounter department: [facility]  :  Assessment & Plan  Mild episode of recurrent major depressive disorder (HCC)           Mixed hyperlipidemia         Acquired hypothyroidism         Attention deficit hyperactivity disorder (ADHD), predominantly inattentive type                History of Present Illness   Needs form completed to begin employment for [employer]. 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 Currently getting treated for depresison, ADD, hypothyroidism, hyperlipidemia and acne.       Review of Systems   Constitutional:  Negative for fatigue and fever.   HENT:  Negative for congestion.    Eyes:  Negative for visual disturbance.   Respiratory:  Negative for chest tightness and shortness of breath.    Cardiovascular:  Negative for chest pain and palpitations.   Gastrointestinal:  Negative for abdominal pain.   Genitourinary:  Negative for difficulty urinating.   Musculoskeletal:  Negative for arthralgias.   Neurological:  Negative for headaches.   Hematological:  Does not bruise/bleed easily.   Psychiatric/Behavioral:  Positive for decreased concentration and dysphoric mood.        Objective   /82 (BP Location: Right arm, Patient Position: Sitting, Cuff Size: Standard)   Pulse (!) 113   Temp 97.7 °F (36.5 °C) (Temporal)   Ht 5' 5\" (1.651 m)   Wt 87.6 kg (193 lb 3.2 oz)   LMP 2025   SpO2 98%   BMI 32.15 kg/m²      Physical Exam  Vitals reviewed.   Constitutional:       Appearance: Normal appearance. She is well-developed.   Cardiovascular:      Rate and Rhythm: Normal rate and regular rhythm.      Heart sounds: Normal heart sounds.   Pulmonary:      Effort: Pulmonary effort is normal.      Breath sounds: Normal breath sounds.   Skin:     General: Skin is warm.   Neurological:      General: No focal deficit present.      Mental Status: She is " alert and oriented to person, place, and time.   Psychiatric:         Mood and Affect: Mood normal.         Behavior: Behavior normal.         Thought Content: Thought content normal.         Judgment: Judgment normal.

## 2025-04-01 ENCOUNTER — TELEPHONE (OUTPATIENT)
Age: 35
End: 2025-04-01

## 2025-04-01 NOTE — TELEPHONE ENCOUNTER
I tried to call pt as there is no insurance listed on the chart. I asked tfor pt to call back if she had any question or if she was going to be self pay.

## 2025-04-08 ENCOUNTER — TELEPHONE (OUTPATIENT)
Age: 35
End: 2025-04-08

## 2025-06-03 ENCOUNTER — OFFICE VISIT (OUTPATIENT)
Age: 35
End: 2025-06-03
Payer: COMMERCIAL

## 2025-06-03 VITALS — HEIGHT: 66 IN | TEMPERATURE: 98.6 F | WEIGHT: 189 LBS | BODY MASS INDEX: 30.37 KG/M2

## 2025-06-03 DIAGNOSIS — B07.9 VERRUCA VULGARIS: Primary | ICD-10-CM

## 2025-06-03 PROCEDURE — 17110 DESTRUCTION B9 LES UP TO 14: CPT | Performed by: NURSE PRACTITIONER

## 2025-06-03 PROCEDURE — 99203 OFFICE O/P NEW LOW 30 MIN: CPT | Performed by: NURSE PRACTITIONER

## 2025-06-03 NOTE — PATIENT INSTRUCTIONS
"  VERUCCA VULGARIS (\"COMMON WART\")    Physical Exam:  Anatomic Location: Right Dorsal Hand  Morphologic Description:  verrucous papule  Pertinent Positives:  Pertinent Negatives:    Additional History of Present Condition:  Returning new patient present for SOC on right hand present since September. Has gotten larger in size does not itch, bleed or burn.       Plan:  Discussed this condition - while contagious - is very common and nearly harmless.  It is caused by an infection with human papillomavirus (HPV).  It is most common in school-aged children; however, warts may occur at any age.  They are also seen in greater frequency in the setting of other dermatitis (due to a defective skin barrier) and immunosuppression (e.g., from medications or HIV infection).  Warts are spread by direct skin-to-skin contact or auto-inoculation if a wart is scratched or picked.  The incubation period may be 12+ months depending on the amount of virus inoculated.  Treatments usually make the wart smaller and less uncomfortable and many times leads to total resolution. In children - even without treatment - most warts (up to 90%) may resolve within 2 years.  Warts may be more persistent in adults.  CRYOTHERAPY.  Patient/family opts to treat the warts with liquid nitrogen.  Usually this is done at 2- to 4-week intervals.  It destroys the outer layer of skin and causes peeling.  It is uncomfortable and may result in blistering for several days or longer.  It may also result in skin color changes (lightening or darkening of the skin) and even numbness if performed over a superficial nerve such as the side of a finger.  It may also result in permanent nail injury/dystrophy if the nail matrix is damaged during freezing.  Success rates are around 70% after 3 to 4 months of regular freezing sessions.  SEE PROCEDURE NOTE BELOW.  SALICYLIC ACID. Soak the warts in warm water for 5 minutes every night followed by gentle filing with a nail file " or pumice stone.  Then apply over-the-counter salicylic acid directly to the wart, wait for it to dry completely, then cover with duct tape overnight. DO NOT USE this method for warts on the face or groin/buttock areas as the reaction may be too inflammatory.  Curetted and Cryotherapy done in office today  Follow up in 4-6 weeks       PROCEDURES PERFORMED TODAY ASSOCIATED WITH THIS CONDITION:            PROCEDURE:  DESTRUCTION OF BENIGN LESIONS WITH CRYOTHERAPY  After a thorough discussion of treatment options and risk/benefits/alternatives (including but not limited to local pain, scarring, dyspigmentation, blistering, and possible superinfection), verbal and written consent were obtained and the aforementioned lesions were treated on with cryotherapy using liquid nitrogen x 3 cycle for 5-10 seconds.

## 2025-06-03 NOTE — PROGRESS NOTES
"[facility] Note     Patient Name: Reynaldo Graves  Encounter Date: 6/3/25       Have you been cared for by a [facility] Dermatologist in the last 3 years and, if so, which description applies to you? NO. I am considered a \"new\" patient and must complete all patient intake questions. I am of child-bearing potential.     REVIEW OF SYSTEMS:  Have you recently had or currently have any of the following? Recent fever or chills? No  Any non-healing wound? No  Are you pregnant or planning to become pregnant? No  Are you currently or planning to be nursing or breast feeding? No   PAST MEDICAL HISTORY:  Have you personally ever had or currently have any of the following?  If \"YES,\" then please provide more detail. Skin cancer (such as Melanoma, Basal Cell Carcinoma, Squamous Cell Carcinoma?  No  Tuberculosis, HIV/AIDS, Hepatitis B or C: No  Radiation Treatment No   HISTORY OF IMMUNOSUPPRESSION:   Do you have a history of any of the following:  Systemic Immunosuppression such as Diabetes, Biologic or Immunotherapy, Chemotherapy, Organ Transplantation, Bone Marrow Transplantation or Prednsione?  No    Answering \"YES\" requires the addition of the dotphrase \"IMMUNOSUPPRESSED\" as the first diagnosis of the patient's visit.   FAMILY HISTORY:  Any \"first degree relatives\" (parent, brother, sister, or child) with the following?    Skin Cancer, Pancreatic or Other Cancer? No   PATIENT EXPERIENCE:    Do you want the Dermatologist to perform a COMPLETE skin exam today including a clinical examination under the \"bra and underwear\" areas?  NO  If necessary, do we have your permission to call and leave a detailed message on your Preferred Phone number that includes your specific medical information?  Yes      Allergies[1] Current Medications[2]        Returning new patient present for SOC on right hand present since September. Has gotten larger in size does not itch, bleed or burn.         Whom besides the patient is " "providing clinical information about today's encounter?   NO ADDITIONAL HISTORIAN (patient alone provided history)    Physical Exam and Assessment/Plan by Diagnosis:        VERUCCA VULGARIS (\"COMMON WART\")    Physical Exam:  Anatomic Location: Right Dorsal Hand  Morphologic Description:  verrucous papule    Additional History of Present Condition:  Returning new patient present for SOC on right hand present since September. Has gotten larger in size; does not itch, bleed or burn.       Plan:  Discussed this condition - while contagious - is very common and nearly harmless.  It is caused by an infection with human papillomavirus (HPV).  It is most common in school-aged children; however, warts may occur at any age.  They are also seen in greater frequency in the setting of other dermatitis (due to a defective skin barrier) and immunosuppression (e.g., from medications or HIV infection).  Warts are spread by direct skin-to-skin contact or auto-inoculation if a wart is scratched or picked.  The incubation period may be 12+ months depending on the amount of virus inoculated.  Treatments usually make the wart smaller and less uncomfortable and many times leads to total resolution. In children - even without treatment - most warts (up to 90%) may resolve within 2 years.  Warts may be more persistent in adults.  CRYOTHERAPY.  Patient/family opts to treat the warts with liquid nitrogen.  Usually this is done at 2- to 4-week intervals.  It destroys the outer layer of skin and causes peeling.  It is uncomfortable and may result in blistering for several days or longer.  It may also result in skin color changes (lightening or darkening of the skin) and even numbness if performed over a superficial nerve such as the side of a finger.  It may also result in permanent nail injury/dystrophy if the nail matrix is damaged during freezing.  Success rates are around 70% after 3 to 4 months of regular freezing sessions.  SEE PROCEDURE " NOTE BELOW.  SALICYLIC ACID. Soak the warts in warm water for 5 minutes every night followed by gentle filing with a nail file or pumice stone.  Then apply over-the-counter salicylic acid directly to the wart, wait for it to dry completely, then cover with duct tape overnight. DO NOT USE this method for warts on the face or groin/buttock areas as the reaction may be too inflammatory.  Curetted and Cryotherapy done in office today  Follow up in 4-6 weeks.       PROCEDURES PERFORMED TODAY ASSOCIATED WITH THIS CONDITION:            PROCEDURE:  DESTRUCTION OF BENIGN LESIONS WITH CRYOTHERAPY  After a thorough discussion of treatment options and risk/benefits/alternatives (including but not limited to local pain, scarring, dyspigmentation, blistering, and possible superinfection), verbal and written consent were obtained and the aforementioned lesions were treated on with cryotherapy using liquid nitrogen x 3 cycle for 5-10 seconds.    TOTAL NUMBER of 1 lesions were treated today on the ANATOMIC LOCATION: Right dorsal hand.    The patient tolerated the procedure well, and after-care instructions were provided.                Scribe Attestation      I,:  Joselyn Aldridge am acting as a scribe while in the presence of the attending physician.:       I,:  ROBYN [facility] personally performed the services described in this documentation    as scribed in my presence.:                    [1] No Known Allergies  [2]   Current Outpatient Medications:     amphetamine-dextroamphetamine (ADDERALL XR) 20 MG 24 hr capsule, Take 1 capsule (20 mg total) by mouth every morning Max Daily Amount: 20 mg, Disp: 30 capsule, Rfl: 0    amphetamine-dextroamphetamine (ADDERALL) 10 mg tablet, Take 1 tablet (10 mg total) by mouth daily Max Daily Amount: 10 mg, Disp: 30 tablet, Rfl: 0    Blood Glucose Monitoring Suppl (OneTouch Verio Reflect) w/Device KIT, Check blood sugars once daily. Please substitute with appropriate alternative as  covered by patient's insurance. Dx: E11.65, Disp: 1 kit, Rfl: 0    buPROPion (WELLBUTRIN XL) 150 mg 24 hr tablet, TAKE 1 TABLET BY MOUTH EVERY DAY IN THE MORNING, Disp: 90 tablet, Rfl: 1    buPROPion (WELLBUTRIN XL) 300 mg 24 hr tablet, TAKE 1 TABLET (300 MG TOTAL) BY MOUTH EVERY MORNING., Disp: 90 tablet, Rfl: 1    etonogestrel-ethinyl estradiol (EluRyng) 0.12-0.015 MG/24HR vaginal ring, INSERT 1 RING VAGINALLY AND LEAVE IN PLACE FOR 3 CONSECUTIVE WEEKS, THEN REMOVE FOR 1 WEEK., Disp: 3 each, Rfl: 1    glucose blood (OneTouch Verio) test strip, Check blood sugars once daily. Please substitute with appropriate alternative as covered by patient's insurance. Dx: E11.65, Disp: 100 each, Rfl: 3    levothyroxine 75 mcg tablet, TAKE 1 TABLET BY MOUTH EVERY DAY, Disp: 90 tablet, Rfl: 1    OneTouch Delica Lancets 33G MISC, Check blood sugars once daily. Please substitute with appropriate alternative as covered by patient's insurance. Dx: E11.65, Disp: 100 each, Rfl: 3    rosuvastatin (CRESTOR) 5 mg tablet, TAKE 1 TABLET (5 MG TOTAL) BY MOUTH DAILY., Disp: 90 tablet, Rfl: 1    spironolactone (ALDACTONE) 25 mg tablet, Take 1 tablet (25 mg total) by mouth daily, Disp: 90 tablet, Rfl: 1

## 2025-06-04 DIAGNOSIS — Z30.09 GENERAL COUNSELING AND ADVICE ON CONTRACEPTIVE MANAGEMENT: ICD-10-CM

## 2025-06-05 RX ORDER — ETONOGESTREL/ETHINYL ESTRADIOL .12-.015MG
RING, VAGINAL VAGINAL
Qty: 3 EACH | Refills: 1 | Status: SHIPPED | OUTPATIENT
Start: 2025-06-05

## 2025-07-09 DIAGNOSIS — Z30.09 GENERAL COUNSELING AND ADVICE ON CONTRACEPTIVE MANAGEMENT: ICD-10-CM

## 2025-07-10 RX ORDER — ETONOGESTREL/ETHINYL ESTRADIOL .12-.015MG
RING, VAGINAL VAGINAL
Qty: 3 EACH | Refills: 1 | Status: SHIPPED | OUTPATIENT
Start: 2025-07-10

## 2025-07-24 ENCOUNTER — OFFICE VISIT (OUTPATIENT)
Age: 35
End: 2025-07-24

## 2025-07-24 VITALS — HEIGHT: 66 IN | BODY MASS INDEX: 30.05 KG/M2 | WEIGHT: 187 LBS

## 2025-07-24 DIAGNOSIS — D48.9 NEOPLASM OF UNCERTAIN BEHAVIOR: Primary | ICD-10-CM

## 2025-07-24 PROCEDURE — 88305 TISSUE EXAM BY PATHOLOGIST: CPT | Performed by: PATHOLOGY

## 2025-07-24 NOTE — PROGRESS NOTES
"[facility] Note     Patient Name: Reynaldo Graves  Encounter Date: 7/24/2025       Have you been cared for by a [facility] D[facility]atologist in the last 3 years and, if so, which description applies to you? Yes. I have been here within the last 3 years, and my medical history has NOT changed since that time. I am of child-bearing potential.     REVIEW OF SYSTEMS:  Have you recently had or currently have any of the following? No changes in my recent health.   PAST MEDICAL HISTORY:  Have you personally ever had or currently have any of the following?  If \"YES,\" then please provide more detail. No changes in my medical history.   HISTORY OF IMMUNOSUPPRESSION: Do you have a history of any of the following:  Systemic Immunosuppression such as Diabetes, Biologic or Immunotherapy, Chemotherapy, Organ Transplantation, Bone Marrow Transplantation or Prednisone?  No     Answering \"YES\" requires the addition of the dotphrase \"IMMUNOSUPPRESSED\" as the first diagnosis of the patient's visit.   FAMILY HISTORY:  Any \"first degree relatives\" (parent, brother, sister, or child) with the following?    No changes in my family's known health.   PATIENT EXPERIENCE:    Do you want the Dermatologist to perform a COMPLETE skin exam today including a clinical examination under the \"bra and underwear\" areas?  NO  If necessary, do we have your permission to call and leave a detailed message on your Preferred Phone number that includes your specific medical information?  Yes      Allergies[1] Current Medications[2]              Whom besides the patient is providing clinical information about today's encounter?   NO ADDITIONAL HISTORIAN (patient alone provided history)    Physical Exam and Assessment/Plan by Diagnosis:      NEOPLASM OF UNCERTAIN BEHAVIOR OF SKIN    Physical Exam:  (Anatomic Location); (Size and Morphological Description); (Differential Diagnosis):  A; right dorsal hand;  0.5 cm verrucous papule; Wart vs Seborrheic " "keratosis         Additional History of Present Condition:  present on exam; present for about 2 years.     Assessment and Plan:  I have discussed with the patient that a sample of skin via a \"skin biopsy” would be potentially helpful to further make a specific diagnosis under the microscope.  Based on a thorough discussion of this condition and the management approach to it (including a comprehensive discussion of the known risks, side effects and potential benefits of treatment), the patient (family) agrees to implement the following specific plan:    Procedure:  Skin Biopsy.  After a thorough discussion of treatment options and risk/benefits/alternatives (including but not limited to local pain, scarring, dyspigmentation, blistering, possible superinfection, and inability to confirm a diagnosis via histopathology), verbal and written consent were obtained and portion of the rash was biopsied for tissue sample.  See below for consent that was obtained from patient and subsequent Procedure Note.   Discussed treating area with cryotherapy after removal due to high suspicion of being a wart; patient in agreement.      PROCEDURE:  DESTRUCTION OF BENIGN LESIONS WITH CRYOTHERAPY  After a thorough discussion of treatment options and risk/benefits/alternatives (including but not limited to local pain, scarring, dyspigmentation, blistering, and possible superinfection), verbal and written consent were obtained and the aforementioned lesions were treated on with cryotherapy using liquid nitrogen x 3 cycle for 5-10 seconds.    TOTAL NUMBER of 1 lesions were treated today on the ANATOMIC LOCATION: right dorsal hand.    The patient tolerated the procedure well, and after-care instructions were provided.    PROCEDURE TANGENTIAL (SHAVE) BIOPSY NOTE:    Performing Physician: Aleksey Mcmullen   Anatomic Location; Clinical Description with size (cm); Pre-Op Diagnosis:   A;right dorsal hand;  0.5 cm verrucous papule; Wart vs " "Seborrheic keratosis   Post-op diagnosis: Same     Local anesthesia: 3:1 1% xylocaine with epi and 1-100,000 buffered     Topical anesthesia: None    Hemostasis: Aluminum chloride       After obtaining informed consent  at which time there was a discussion about the purpose of biopsy  and low risks of infection and bleeding.  The area was prepped and draped in the usual fashion. Anesthesia was obtained with 1% lidocaine with epinephrine. A shave biopsy to an appropriate sampling depth was obtained by Shave (Dermablade or 15 blade) The resulting wound was covered with surgical ointment and bandaged appropriately.     The patient tolerated the procedure well without complications and was without signs of functional compromise.      Specimen has been sent for review by Dermatopathology.    Standard post-procedure care has been explained and has been included in written form within the patient's copy of Informed Consent.    INFORMED CONSENT DISCUSSION AND POST-OPERATIVE INSTRUCTIONS FOR PATIENT    I.  RATIONALE FOR PROCEDURE  I understand that a skin biopsy allows the Dermatologist to test a lesion or rash under the microscope to obtain a diagnosis.  It usually involves numbing the area with numbing medication and removing a small piece of skin; sometimes the area will be closed with sutures. In this specific procedure, sutures are not usually needed.  If any sutures are placed, then they are usually need to be removed in 2 weeks or less.    I understand that my Dermatologist recommends that a skin \"shave\" biopsy be performed today.  A local anesthetic, similar to the kind that a dentist uses when filling a cavity, will be injected with a very small needle into the skin area to be sampled.  The injected skin and tissue underneath \"will go to sleep” and become numb so no pain should be felt afterwards.  An instrument shaped like a tiny \"razor blade\" (shave biopsy instrument) will be used to cut a small piece of tissue " "and skin from the area so that a sample of tissue can be taken and examined more closely under the microscope.  A slight amount of bleeding will occur, but it will be stopped with direct pressure and a pressure bandage and any other appropriate methods.  I understands that a scar will form where the wound was created.  Surgical ointment will be applied to help protect the wound.  Sutures are not usually needed.    II.  RISKS AND POTENTIAL COMPLICATIONS   I understand the risks and potential complications of a skin biopsy include but are not limited to the following:  Bleeding  Infection  Pain  Scar/keloid  Skin discoloration  Incomplete Removal  Recurrence  Nerve Damage/Numbness/Loss of Function  Allergic Reaction to Anesthesia  Biopsies are diagnostic procedures and based on findings additional treatment or evaluation may be required  Loss or destruction of specimen resulting in no additional findings    My Dermatologist has explained to me the nature of the condition, the nature of the procedure, and the benefits to be reasonably expected compared with alternative approaches.  My Dermatologist has discussed the likelihood of major risks or complications of this procedure including the specific risks listed above, such as bleeding, infection, and scarring/keloid.  I understand that a scar is expected after this procedure.  I understand that my physician cannot predict if the scar will form a \"keloid,\" which extends beyond the borders of the wound that is created.  A keloid is a thick, painful, and bumpy scar.  A keloid can be difficult to treat, as it does not always respond well to therapy, which includes injecting cortisone directly into the keloid every few weeks.  While this usually reduces the pain and size of the scar, it does not eliminate it.      I understand that photographs may be taken before and after the procedure.  These will be maintained as part of the medical providers confidential records and may " "not be made available to me.  I further authorize the medical provider to use the photographs for teaching purposes or to illustrate scientific papers, books, or lectures if in his/her judgment, medical research, education, or science may benefit from its use.    I have had an opportunity to fully inquire about the risks and benefits of this procedure and its alternatives.   I have been given ample time and opportunity to ask questions and to seek a second opinion if I wished to do so.  I acknowledge that there have specifically been no guarantees as to the cosmetic results from the procedure.  I am aware that with any procedure there is always the possibility of an unexpected complication.    III. POST-PROCEDURAL CARE (WHAT YOU WILL NEED TO DO \"AFTER THE BIOPSY\" TO OPTIMIZE HEALING)    Keep the area clean and dry.  Try NOT to remove the bandage or get it wet for the first 24 hours.    Gently clean the area and apply surgical ointment (such as Vaseline petrolatum ointment, which is available \"over the counter\" and not a prescription) to the biopsy site for up to 2 weeks straight.  This acts to protect the wound from the outside world.      Sutures are not usually placed in this procedure.  If any sutures were placed, return for suture removal as instructed (generally 1 week for the face, 2 weeks for the body).      Take Acetaminophen (Tylenol) for discomfort, if no contraindications.  Ibuprofen or aspirin could make bleeding worse.    Call our office immediately for signs of infection: fever, chills, increased redness, warmth, tenderness, discomfort/pain, or pus or foul smell coming from the wound.    WHAT TO DO IF THERE IS ANY BLEEDING?  If a small amount of bleeding is noticed, place a clean cloth over the area and apply firm pressure for ten minutes.  Check the wound after 10 minutes of direct pressure.  If bleeding persists, try one more time for an additional 10 minutes of direct pressure on the area.  If the " bleeding becomes heavier or does not stop after the second attempt, or if you have any other questions about this procedure, then please call your [facility]'s Dermatologist by calling [phone] (SKIN).     I hereby acknowledge that I have reviewed and verified the site with my Dermatologist and have requested and authorized my Dermatologist to proceed with the procedure.         Scribe Attestation      I,:  Denice Gautam MA am acting as a scribe while in the presence of the attending physician.:       I,:  ROBYN Butler personally performed the services described in this documentation    as scribed in my presence.:                      [1] No Known Allergies  [2]   Current Outpatient Medications:     amphetamine-dextroamphetamine (ADDERALL XR) 20 MG 24 hr capsule, Take 1 capsule (20 mg total) by mouth every morning Max Daily Amount: 20 mg, Disp: 30 capsule, Rfl: 0    amphetamine-dextroamphetamine (ADDERALL) 10 mg tablet, Take 1 tablet (10 mg total) by mouth daily Max Daily Amount: 10 mg, Disp: 30 tablet, Rfl: 0    Blood Glucose Monitoring Suppl (OneTouch Verio Reflect) w/Device KIT, Check blood sugars once daily. Please substitute with appropriate alternative as covered by patient's insurance. Dx: E11.65, Disp: 1 kit, Rfl: 0    buPROPion (WELLBUTRIN XL) 150 mg 24 hr tablet, TAKE 1 TABLET BY MOUTH EVERY DAY IN THE MORNING, Disp: 90 tablet, Rfl: 1    buPROPion (WELLBUTRIN XL) 300 mg 24 hr tablet, TAKE 1 TABLET (300 MG TOTAL) BY MOUTH EVERY MORNING., Disp: 90 tablet, Rfl: 1    glucose blood (OneTouch Verio) test strip, Check blood sugars once daily. Please substitute with appropriate alternative as covered by patient's insurance. Dx: E11.65, Disp: 100 each, Rfl: 3    levothyroxine 75 mcg tablet, TAKE 1 TABLET BY MOUTH EVERY DAY, Disp: 90 tablet, Rfl: 1    NuvaRing 0.12-0.015 MG/24HR vaginal ring, INSERT 1 RING VAGINALLY AND LEAVE IN PLACE FOR 3 CONSECUTIVE WEEKS, THEN REMOVE FOR 1 WEEK.,  Disp: 3 each, Rfl: 1    OneTouch Delica Lancets 33G MISC, Check blood sugars once daily. Please substitute with appropriate alternative as covered by patient's insurance. Dx: E11.65, Disp: 100 each, Rfl: 3    rosuvastatin (CRESTOR) 5 mg tablet, TAKE 1 TABLET (5 MG TOTAL) BY MOUTH DAILY., Disp: 90 tablet, Rfl: 1    spironolactone (ALDACTONE) 25 mg tablet, Take 1 tablet (25 mg total) by mouth daily, Disp: 90 tablet, Rfl: 1

## 2025-07-25 ENCOUNTER — OFFICE VISIT (OUTPATIENT)
Age: 35
End: 2025-07-25
Payer: COMMERCIAL

## 2025-07-25 VITALS
HEART RATE: 108 BPM | BODY MASS INDEX: 31.99 KG/M2 | SYSTOLIC BLOOD PRESSURE: 128 MMHG | WEIGHT: 192 LBS | OXYGEN SATURATION: 98 % | HEIGHT: 65 IN | DIASTOLIC BLOOD PRESSURE: 80 MMHG

## 2025-07-25 DIAGNOSIS — E66.811 CLASS 1 OBESITY DUE TO EXCESS CALORIES WITHOUT SERIOUS COMORBIDITY WITH BODY MASS INDEX (BMI) OF 31.0 TO 31.9 IN ADULT: Primary | ICD-10-CM

## 2025-07-25 DIAGNOSIS — G47.10 HYPERSOMNOLENCE: ICD-10-CM

## 2025-07-25 DIAGNOSIS — E66.09 CLASS 1 OBESITY DUE TO EXCESS CALORIES WITHOUT SERIOUS COMORBIDITY WITH BODY MASS INDEX (BMI) OF 31.0 TO 31.9 IN ADULT: Primary | ICD-10-CM

## 2025-07-25 PROCEDURE — 99213 OFFICE O/P EST LOW 20 MIN: CPT | Performed by: INTERNAL MEDICINE

## 2025-07-25 RX ORDER — TIRZEPATIDE 2.5 MG/.5ML
2.5 INJECTION, SOLUTION SUBCUTANEOUS WEEKLY
Qty: 2 ML | Refills: 0 | Status: SHIPPED | OUTPATIENT
Start: 2025-07-25 | End: 2025-08-22

## 2025-07-25 NOTE — ASSESSMENT & PLAN NOTE
She was previously diagnosed with hypersomnia  She is currently on Adderall and notes improvement

## 2025-07-25 NOTE — PROGRESS NOTES
Name: Reynaldo Graves      : 1990      MRN: [id number]  Encounter Provider: Mikael Zaragoza MD  Encounter Date: 2025   Encounter department: [facility] [place]    :  Assessment & Plan  Class 1 obesity due to excess calories without serious comorbidity with body mass index (BMI) of 31.0 to 31.9 in adult  Counseled patient on lifestyle modifications including diet and exercise  Discussed risks and benefits involved with GLP-1 agonist  She has no contraindications  She has tried and failed multiple different methods of weight loss  She did not have benefit from bupropion and continue to gain weight  Counseled patient on side effects of Zepbound    Plan  Ordered Zepbound at 2.5 mg  Follow-up in 1 month      Orders:    tirzepatide (Zepbound) 2.5 mg/0.5 mL auto-injector; Inject 0.5 mL (2.5 mg total) under the skin once a week for 28 days    Hypersomnolence  She was previously diagnosed with hypersomnia  She is currently on Adderall and notes improvement         History of Present Illness     Pleasant 34-year-old female with a past medical history of hypersomnia, depression, vitamin D deficiency who presents for follow-up.  She was previously seen for possible sleep apnea.  However home sleep study was negative.  Since then she has gained a significant amount of weight.  Her baseline weight is at 150 pounds however she has gained 42 pounds over the last 2 years.  She has tried multiple weight loss methods including dieting, exercising, intermittent fasting.  She use the [email or web address] emily but continued to gain weight.  She has tried Wellbutrin for weight loss in addition to depression.  She is currently on Adderall for ADHD and hypersomnia.                      Sitting and reading: Moderate chance of dozing  Watching TV: Moderate chance of dozing  Sitting, inactive in a public place (e.g. a theatre or a meeting): Moderate chance of dozing  As a passenger in a car for an hour without a break: High chance of  dozing  Lying down to rest in the afternoon when circumstances permit: High chance of dozing  Sitting and talking to someone: Slight chance of dozing  Sitting quietly after a lunch without alcohol: High chance of dozing  In a car, while stopped for a few minutes in traffic: High chance of dozing  Total score: 19       Review of Systems   Constitutional: Negative.    Eyes: Negative.    Respiratory: Negative.     Cardiovascular: Negative.    Gastrointestinal: Negative.    Endocrine: Negative.    Genitourinary: Negative.    Musculoskeletal: Negative.    Allergic/Immunologic: Negative.    Neurological: Negative.    Psychiatric/Behavioral: Negative.       Pertinent positives/negatives included in HPI and also as noted:       Pertinent Medical History           Medical History Reviewed by provider this encounter:     .  Past Medical History   Past Medical History[1]  Past Surgical History[2]  Family History[3]   reports that she has never smoked. She has never used smokeless tobacco. She reports current alcohol use. She reports that she does not use drugs.  Current Outpatient Medications   Medication Instructions    amphetamine-dextroamphetamine (ADDERALL XR) 20 MG 24 hr capsule 20 mg, Oral, Every morning    amphetamine-dextroamphetamine (ADDERALL) 10 mg tablet 10 mg, Oral, Daily    Blood Glucose Monitoring Suppl (OneTouch Verio Reflect) w/Device KIT Check blood sugars once daily. Please substitute with appropriate alternative as covered by patient's insurance. Dx: E11.65    buPROPion (WELLBUTRIN XL) 150 mg, Oral, Every morning    buPROPion (WELLBUTRIN XL) 300 mg, Oral, Every morning    glucose blood (OneTouch Verio) test strip Check blood sugars once daily. Please substitute with appropriate alternative as covered by patient's insurance. Dx: E11.65    levothyroxine 75 mcg, Oral, Daily    NuvaRing 0.12-0.015 MG/24HR vaginal ring INSERT 1 RING VAGINALLY AND LEAVE IN PLACE FOR 3 CONSECUTIVE WEEKS, THEN REMOVE FOR 1 WEEK.     "OneTouch Delica Lancets 33G MISC Check blood sugars once daily. Please substitute with appropriate alternative as covered by patient's insurance. Dx: E11.65    rosuvastatin (CRESTOR) 5 mg, Oral, Daily    spironolactone (ALDACTONE) 25 mg, Oral, Daily    tiZANidine (ZANAFLEX) 4 mg, 2 times daily   Allergies[4]   Medications Ordered Prior to Encounter[5]   Social History[6]  Objective   /80 (BP Location: Right arm, Patient Position: Sitting, Cuff Size: Standard)   Pulse (!) 108   Ht 5' 5\" (1.651 m)   Wt 87.1 kg (192 lb)   SpO2 98%   BMI 31.95 kg/m²     Neck Circumference: 20  Physical Exam  Vitals reviewed.   HENT:      Head: Normocephalic and atraumatic.      Nose: Nose normal.      Mouth/Throat:      Mouth: Mucous membranes are moist.     Eyes:      Extraocular Movements: Extraocular movements intact.      Pupils: Pupils are equal, round, and reactive to light.       Cardiovascular:      Rate and Rhythm: Normal rate and regular rhythm.      Pulses: Normal pulses.   Pulmonary:      Effort: Pulmonary effort is normal.      Breath sounds: Normal breath sounds.   Abdominal:      General: Abdomen is flat. Bowel sounds are normal.      Palpations: Abdomen is soft.     Musculoskeletal:         General: Normal range of motion.      Cervical back: Normal range of motion.     Skin:     General: Skin is warm.     Neurological:      General: No focal deficit present.      Mental Status: She is alert and oriented to person, place, and time. Mental status is at baseline.       Visit Vitals  /80 (BP Location: Right arm, Patient Position: Sitting, Cuff Size: Standard)   Pulse (!) 108   Ht 5' 5\" (1.651 m)   Wt 87.1 kg (192 lb)   SpO2 98%   BMI 31.95 kg/m²   OB Status Unknown   Smoking Status Never   BSA 1.94 m²           Data  Lab Results   Component Value Date    HGB 14.0 09/12/2024    HCT 42.1 09/12/2024    MCV 87 09/12/2024      Lab Results   Component Value Date    CALCIUM 9.2 09/12/2024    K 4.4 09/12/2024    " "CO2 27 09/12/2024     09/12/2024    BUN 12 09/12/2024    CREATININE 0.90 09/12/2024     No results found for: \"IRON\", \"TIBC\", \"FERRITIN\"  Lab Results   Component Value Date    AST 18 09/12/2024    ALT 24 09/12/2024            Prior Authorization Clinical Questions for Weight Management Pharmacotherapy    1. Does the patient have a contrainidcation to medication prescribed for weight management?: No  2. Does the patient have a diagnosis of obesity, confirmed by a BMI greater than or equal to 30 kg/m^2?: Yes  3. Does the patient have a BMI of greater than or equal to 27 kg/m^2 with at least one weight-related comorbidity/risk factor/complication (e.g. diabetes, dyslipidemia, coronary artery disease)?: Yes  4. Weight-related co-morbidities/risk factors: dyslipidemia, depression  5. WEGOVY CVA Indication: Does patient have established documented cardiovascular disease (history of a prior heart attack (myocardial infarction), stroke, or symptomatic peripheral arterial disease (PAD)?: No  6. ZEPBOUND THUY Indication: Does patient have documented THUY diagnosed via sleep study (insurance will require copy of sleep study results for approval)?: No  7. Has the patient been on a weight loss regimen of low-calorie diet, increased physical activity, and lifestyle modifications for a minimum of 6 months?: Yes  8. Has the patient completed a comprehensive weight loss program (ie, Weight Watchers, No[employer], Bariatrics, other emily on phone)? If so, what?: Yes   -- Q8 Further Explanation: Used Noom but continued to gain weight   9. Does the patient have a history of type 2 diabetes?: No  10. Has the member tried and failed other weight loss medication within the past 12 months?: Yes   -- Q10 Further explanation: Bupropion was tried but she continued to gain weight   11. Will the member use requested medication in combination with another GLP agonist or weight loss drug?: No  12. Is the medication a controlled substance?: " No  Additional comments: She has tried numerous weight loss methods with limited benefit.  She has attended nutrition counseling.  However she has continued to gain weight.     Baseline weight (in pounds): 180 lbs  Current weight (in pounds): 192 lbs  Weight loss percentage: 6.67%                 [1]   Past Medical History:  Diagnosis Date    Anxiety     Depression     Hypersomnia    [2]   Past Surgical History:  Procedure Laterality Date    COLONOSCOPY      SEPTOPLASTY      WISDOM TOOTH EXTRACTION     [3]   Family History  Problem Relation Name Age of Onset    Diabetes Mother      Hyperlipidemia Father     [4] No Known Allergies  [5]   Current Outpatient Medications on File Prior to Visit   Medication Sig Dispense Refill    amphetamine-dextroamphetamine (ADDERALL XR) 20 MG 24 hr capsule Take 1 capsule (20 mg total) by mouth every morning Max Daily Amount: 20 mg 30 capsule 0    amphetamine-dextroamphetamine (ADDERALL) 10 mg tablet Take 1 tablet (10 mg total) by mouth daily Max Daily Amount: 10 mg 30 tablet 0    Blood Glucose Monitoring Suppl (OneTouch Verio Reflect) w/Device KIT Check blood sugars once daily. Please substitute with appropriate alternative as covered by patient's insurance. Dx: E11.65 1 kit 0    buPROPion (WELLBUTRIN XL) 150 mg 24 hr tablet TAKE 1 TABLET BY MOUTH EVERY DAY IN THE MORNING 90 tablet 1    buPROPion (WELLBUTRIN XL) 300 mg 24 hr tablet TAKE 1 TABLET (300 MG TOTAL) BY MOUTH EVERY MORNING. 90 tablet 1    glucose blood (OneTouch Verio) test strip Check blood sugars once daily. Please substitute with appropriate alternative as covered by patient's insurance. Dx: E11.65 100 each 3    levothyroxine 75 mcg tablet TAKE 1 TABLET BY MOUTH EVERY DAY 90 tablet 1    NuvaRing 0.12-0.015 MG/24HR vaginal ring INSERT 1 RING VAGINALLY AND LEAVE IN PLACE FOR 3 CONSECUTIVE WEEKS, THEN REMOVE FOR 1 WEEK. 3 each 1    OneTouch Delica Lancets 33G MISC Check blood sugars once daily. Please substitute with  appropriate alternative as covered by patient's insurance. Dx: E11.65 100 each 3    rosuvastatin (CRESTOR) 5 mg tablet TAKE 1 TABLET (5 MG TOTAL) BY MOUTH DAILY. 90 tablet 1    tiZANidine (ZANAFLEX) 4 mg tablet Take 4 mg by mouth 2 (two) times a day      spironolactone (ALDACTONE) 25 mg tablet Take 1 tablet (25 mg total) by mouth daily 90 tablet 1     No current facility-administered medications on file prior to visit.   [6]   Social History  Tobacco Use    Smoking status: Never    Smokeless tobacco: Never   Vaping Use    Vaping status: Never Used   Substance and Sexual Activity    Alcohol use: Yes     Comment: occasionaly     Drug use: Never    Sexual activity: Never

## 2025-07-28 ENCOUNTER — TELEPHONE (OUTPATIENT)
Age: 35
End: 2025-07-28

## 2025-07-29 PROCEDURE — 88305 TISSUE EXAM BY PATHOLOGIST: CPT | Performed by: PATHOLOGY

## 2025-08-07 DIAGNOSIS — E03.9 ACQUIRED HYPOTHYROIDISM: ICD-10-CM

## 2025-08-08 RX ORDER — LEVOTHYROXINE SODIUM 75 UG/1
75 TABLET ORAL DAILY
Qty: 90 TABLET | Refills: 1 | Status: SHIPPED | OUTPATIENT
Start: 2025-08-08